# Patient Record
Sex: MALE | Race: WHITE
[De-identification: names, ages, dates, MRNs, and addresses within clinical notes are randomized per-mention and may not be internally consistent; named-entity substitution may affect disease eponyms.]

---

## 2017-01-28 ENCOUNTER — HOSPITAL ENCOUNTER (EMERGENCY)
Dept: HOSPITAL 61 - ER | Age: 57
Discharge: HOME | End: 2017-01-28
Payer: COMMERCIAL

## 2017-01-28 VITALS — WEIGHT: 201 LBS | BODY MASS INDEX: 28.77 KG/M2 | HEIGHT: 70 IN

## 2017-01-28 VITALS — SYSTOLIC BLOOD PRESSURE: 137 MMHG | DIASTOLIC BLOOD PRESSURE: 93 MMHG

## 2017-01-28 DIAGNOSIS — I10: ICD-10-CM

## 2017-01-28 DIAGNOSIS — F12.10: ICD-10-CM

## 2017-01-28 DIAGNOSIS — G89.29: ICD-10-CM

## 2017-01-28 DIAGNOSIS — E11.649: Primary | ICD-10-CM

## 2017-01-28 DIAGNOSIS — K21.9: ICD-10-CM

## 2017-01-28 DIAGNOSIS — Z88.2: ICD-10-CM

## 2017-01-28 DIAGNOSIS — E78.00: ICD-10-CM

## 2017-01-28 LAB
ANION GAP SERPL CALC-SCNC: 13 MMOL/L (ref 6–14)
BASOPHILS # BLD AUTO: 0.1 X10^3/UL (ref 0–0.2)
BASOPHILS NFR BLD: 1 % (ref 0–3)
BUN SERPL-MCNC: 33 MG/DL (ref 8–26)
CALCIUM SERPL-MCNC: 9.3 MG/DL (ref 8.5–10.1)
CHLORIDE SERPL-SCNC: 103 MMOL/L (ref 98–107)
CO2 SERPL-SCNC: 23 MMOL/L (ref 21–32)
CREAT SERPL-MCNC: 1.1 MG/DL (ref 0.7–1.3)
EOSINOPHIL NFR BLD: 4 % (ref 0–3)
ERYTHROCYTE [DISTWIDTH] IN BLOOD BY AUTOMATED COUNT: 12.5 % (ref 11.5–14.5)
GFR SERPLBLD BASED ON 1.73 SQ M-ARVRAT: 69.8 ML/MIN
GLUCOSE SERPL-MCNC: 113 MG/DL (ref 70–99)
HCT VFR BLD CALC: 42.7 % (ref 39–53)
HGB BLD-MCNC: 15 G/DL (ref 13–17.5)
LYMPHOCYTES # BLD: 1.5 X10^3/UL (ref 1–4.8)
LYMPHOCYTES NFR BLD AUTO: 19 % (ref 24–48)
MCH RBC QN AUTO: 32 PG (ref 25–35)
MCHC RBC AUTO-ENTMCNC: 35 G/DL (ref 31–37)
MCV RBC AUTO: 92 FL (ref 79–100)
MONOCYTES NFR BLD: 8 % (ref 0–9)
NEUTROPHILS NFR BLD AUTO: 68 % (ref 31–73)
PLATELET # BLD AUTO: 168 X10^3/UL (ref 140–400)
POTASSIUM SERPL-SCNC: 3.3 MMOL/L (ref 3.5–5.1)
RBC # BLD AUTO: 4.67 X10^6/UL (ref 4.3–5.7)
SODIUM SERPL-SCNC: 139 MMOL/L (ref 136–145)
WBC # BLD AUTO: 8.1 X10^3/UL (ref 4–11)

## 2017-01-28 PROCEDURE — 99284 EMERGENCY DEPT VISIT MOD MDM: CPT

## 2017-01-28 PROCEDURE — 85027 COMPLETE CBC AUTOMATED: CPT

## 2017-01-28 PROCEDURE — 80048 BASIC METABOLIC PNL TOTAL CA: CPT

## 2017-01-28 PROCEDURE — 82947 ASSAY GLUCOSE BLOOD QUANT: CPT

## 2017-01-28 PROCEDURE — 36415 COLL VENOUS BLD VENIPUNCTURE: CPT

## 2017-01-28 NOTE — PHYS DOC
Past Medical History


Past Medical History:  Diabetes-Type II, GERD, High Cholesterol, Hypertension, 

Kidney Stone, Other


Additional Past Medical Histor:  CHRONIC BACK PAIN, kidney stones


Past Surgical History:  Other


Additional Past Surgical Histo:  lithotripsy, back


Alcohol Use:  Heavy


Additional Information:  


drinking approx 1/5 whiskey weekly


Drug Use:  Marijuana





Adult General


Chief Complaint


Chief Complaint:  BLOOD SUGAR PROBLEM





HPI


HPI


Patient is a 56  year old male who presents by EMS for fatigue and altered 

mental status. His wife called EMS for concern of low blood sugar. He is on a 

long acting insulin only and has had a few episodes of hypoglycemia like this. 

He becomes sleepy and clammy. EMS arrived and he had a blood sugar in the 50s, 

of which she was given D50 IV with improvement of blood sugar to 110s.  He 

denies headache, vision changes, numbness, tingling, weakness, chest pain, 

abdominal pain, nausea or vomiting, diarrhea, dysuria.


He does mention having some bloody urine and flank pain over the past 2 days, 

but he has been asymptomatic of this today. He did not come here for evaluation 

of kidney stones, but he thinks this is related to his chronic kidney stones. 

He does not want to be evaluated for this as his symptoms are controlled.





Review of Systems


Review of Systems





Constitutional: Denies fever or chills []


Eyes: Denies change in visual acuity, redness, or eye pain []


HENT: Denies nasal congestion or sore throat []


Respiratory: Denies cough or shortness of breath []


Cardiovascular: No additional information not addressed in HPI []


GI: Denies abdominal pain, nausea, vomiting, bloody stools or diarrhea []


: Denies dysuria or hematuria []


Musculoskeletal: Denies back pain or joint pain []


Integument: Denies rash or skin lesions []


Neurologic: Denies headache, focal weakness or sensory changes []


Endocrine: Denies polyuria or polydipsia []





Allergies


Allergies





 Allergies








Coded Allergies Type Severity Reaction Last Updated Verified


 


  Sulfa (Sulfonamide Antibiotics) Allergy Severe WARD ERNESTINA SYNDROME 10/26/

16 Yes











Physical Exam


Physical Exam





Constitutional: Well developed, well nourished, no acute distress, non-toxic 

appearance. []


HENT: Normocephalic, atraumatic, bilateral external ears normal, oropharynx 

moist, no oral exudates, nose normal. []


Eyes: PERRLA, EOMI. [] 


Neck: Normal range of motion, supple. [] 


Cardiovascular:Heart rate regular rhythm []


Lungs & Thorax:  Bilateral breath sounds clear to auscultation []


Abdomen: Bowel sounds normal, soft, no tenderness. [] 


Skin: Warm, dry, no erythema, no rash. [] 


Back: No tenderness, no CVA tenderness. [] 


Extremities: ROM intact, no edema. [] 


Neurologic: Alert and oriented X 3, normal motor function, normal sensory 

function, no focal deficits noted, cranial nerves II through XII intact. []


Psychologic: Affect normal, judgement normal, mood normal. []





Current Patient Data


Vital Signs





 Vital Signs








  Date Time  Temp Pulse Resp B/P Pulse Ox O2 Delivery O2 Flow Rate FiO2


 


1/28/17 03:23  95 20 137/93 98   


 


1/28/17 00:56 97.0     Room Air  





 97.0       








Lab Values





 Laboratory Tests








Test


  1/28/17


01:01 1/28/17


01:40 1/28/17


03:06


 


Glucose (Fingerstick)


  116mg/dL


(70-99)  H 


  200mg/dL


(70-99)  H


 


White Blood Count


  


  8.1x10^3/uL


(4.0-11.0) 


 


 


Red Blood Count


  


  4.67x10^6/uL


(4.30-5.70) 


 


 


Hemoglobin


  


  15.0g/dL


(13.0-17.5) 


 


 


Hematocrit


  


  42.7%


(39.0-53.0) 


 


 


Mean Corpuscular Volume  92fL ()   


 


Mean Corpuscular Hemoglobin  32pg (25-35)   


 


Mean Corpuscular Hemoglobin


Concent 


  35g/dL (31-37)


  


 


 


Red Cell Distribution Width


  


  12.5%


(11.5-14.5) 


 


 


Platelet Count


  


  168x10^3/uL


(140-400) 


 


 


Neutrophils (%) (Auto)  68% (31-73)   


 


Lymphocytes (%) (Auto)  19% (24-48)  L 


 


Monocytes (%) (Auto)  8% (0-9)   


 


Eosinophils (%) (Auto)  4% (0-3)  H 


 


Basophils (%) (Auto)  1% (0-3)   


 


Neutrophils # (Auto)


  


  5.5x10^3uL


(1.8-7.7) 


 


 


Lymphocytes # (Auto)


  


  1.5x10^3/uL


(1.0-4.8) 


 


 


Monocytes # (Auto)


  


  0.6x10^3/uL


(0.0-1.1) 


 


 


Eosinophils # (Auto)


  


  0.3x10^3/uL


(0.0-0.7) 


 


 


Basophils # (Auto)


  


  0.1x10^3/uL


(0.0-0.2) 


 


 


Sodium Level


  


  139mmol/L


(136-145) 


 


 


Potassium Level


  


  3.3mmol/L


(3.5-5.1)  L 


 


 


Chloride Level


  


  103mmol/L


() 


 


 


Carbon Dioxide Level


  


  23mmol/L


(21-32) 


 


 


Anion Gap  13 (6-14)   


 


Blood Urea Nitrogen


  


  33mg/dL (8-26)


H 


 


 


Creatinine


  


  1.1mg/dL


(0.7-1.3) 


 


 


Estimated GFR


(Cockcroft-Gault) 


  69.8  


  


 


 


Glucose Level


  


  113mg/dL


(70-99)  H 


 


 


Calcium Level


  


  9.3mg/dL


(8.5-10.1) 


 





 Laboratory Tests


1/28/17 01:40








 Laboratory Tests


1/28/17 01:40














Course & Med Decision Making


Course & Med Decision Making


Pertinent Labs and Imaging studies reviewed. (See chart for details)





Upon arrival here, he is alert. He was given food to treat hypoglycemia. 

Laboratory evaluation is unremarkable. He has maintained a normal blood glucose 

during his ED stay. He would like to go home and monitor his blood sugar 

closely. He will follow-up with his primary care doctor for diabetic 

management. Return precautions discussed. He and wife understand and agree with 

plan.





Dragon Disclaimer


Dragon Disclaimer


This electronic medical record was generated, in whole or in part, using a 

voice recognition dictation system.





Departure


Departure


Impression:  


 Primary Impression:  


 Hypoglycemia


Disposition:  01 HOME, SELF-CARE


Condition:  STABLE


Referrals:  


FRIDA DOE (PCP)


Patient Instructions:  Hypoglycemia, Easy-to-Read





Additional Instructions:


Follow-up with your primary care doctor. Return for any concerns.








JALEN CAMARA MD Jan 28, 2017 03:13

## 2017-03-04 ENCOUNTER — HOSPITAL ENCOUNTER (EMERGENCY)
Dept: HOSPITAL 61 - ER | Age: 57
LOS: 1 days | Discharge: HOME | End: 2017-03-05
Payer: COMMERCIAL

## 2017-03-04 VITALS — WEIGHT: 192 LBS | BODY MASS INDEX: 26.88 KG/M2 | HEIGHT: 71 IN

## 2017-03-04 DIAGNOSIS — E78.00: ICD-10-CM

## 2017-03-04 DIAGNOSIS — G89.29: ICD-10-CM

## 2017-03-04 DIAGNOSIS — N20.0: Primary | ICD-10-CM

## 2017-03-04 DIAGNOSIS — F12.10: ICD-10-CM

## 2017-03-04 DIAGNOSIS — Z88.2: ICD-10-CM

## 2017-03-04 DIAGNOSIS — E11.9: ICD-10-CM

## 2017-03-04 DIAGNOSIS — I10: ICD-10-CM

## 2017-03-04 DIAGNOSIS — K21.9: ICD-10-CM

## 2017-03-04 LAB
ALBUMIN SERPL-MCNC: 3.8 G/DL (ref 3.4–5)
ALBUMIN/GLOB SERPL: 1 {RATIO} (ref 1–1.7)
ALP SERPL-CCNC: 142 U/L (ref 46–116)
ALT SERPL-CCNC: 21 U/L (ref 16–63)
ANION GAP SERPL CALC-SCNC: 16 MMOL/L (ref 6–14)
AST SERPL-CCNC: 25 U/L (ref 15–37)
BACTERIA #/AREA URNS HPF: 0 /HPF
BASOPHILS # BLD AUTO: 0 X10^3/UL (ref 0–0.2)
BASOPHILS NFR BLD: 1 % (ref 0–3)
BILIRUB SERPL-MCNC: 1.3 MG/DL (ref 0.2–1)
BILIRUB UR QL STRIP: NEGATIVE
BUN SERPL-MCNC: 23 MG/DL (ref 8–26)
BUN/CREAT SERPL: 23 (ref 6–20)
CALCIUM SERPL-MCNC: 9.6 MG/DL (ref 8.5–10.1)
CHLORIDE SERPL-SCNC: 102 MMOL/L (ref 98–107)
CO2 SERPL-SCNC: 21 MMOL/L (ref 21–32)
CREAT SERPL-MCNC: 1 MG/DL (ref 0.7–1.3)
EOSINOPHIL NFR BLD: 5 % (ref 0–3)
ERYTHROCYTE [DISTWIDTH] IN BLOOD BY AUTOMATED COUNT: 12.8 % (ref 11.5–14.5)
GFR SERPLBLD BASED ON 1.73 SQ M-ARVRAT: 77.9 ML/MIN
GLOBULIN SER-MCNC: 3.7 G/DL (ref 2.2–3.8)
GLUCOSE SERPL-MCNC: 174 MG/DL (ref 70–99)
GLUCOSE UR STRIP-MCNC: NEGATIVE MG/DL
HCT VFR BLD CALC: 43.2 % (ref 39–53)
HGB BLD-MCNC: 14.9 G/DL (ref 13–17.5)
LYMPHOCYTES # BLD: 0.5 X10^3/UL (ref 1–4.8)
LYMPHOCYTES NFR BLD AUTO: 7 % (ref 24–48)
MCH RBC QN AUTO: 32 PG (ref 25–35)
MCHC RBC AUTO-ENTMCNC: 35 G/DL (ref 31–37)
MCV RBC AUTO: 92 FL (ref 79–100)
MONOCYTES NFR BLD: 5 % (ref 0–9)
NEUTROPHILS NFR BLD AUTO: 83 % (ref 31–73)
NITRITE UR QL STRIP: NEGATIVE
PH UR STRIP: 5.5 [PH]
PLATELET # BLD AUTO: 179 X10^3/UL (ref 140–400)
POTASSIUM SERPL-SCNC: 4.3 MMOL/L (ref 3.5–5.1)
PROT SERPL-MCNC: 7.5 G/DL (ref 6.4–8.2)
PROT UR STRIP-MCNC: NEGATIVE MG/DL
RBC # BLD AUTO: 4.68 X10^6/UL (ref 4.3–5.7)
RBC #/AREA URNS HPF: (no result) /HPF (ref 0–2)
SODIUM SERPL-SCNC: 139 MMOL/L (ref 136–145)
SP GR UR STRIP: 1.02
SQUAMOUS #/AREA URNS LPF: (no result) /LPF
UROBILINOGEN UR-MCNC: 1 MG/DL
WBC # BLD AUTO: 6.5 X10^3/UL (ref 4–11)
WBC #/AREA URNS HPF: (no result) /HPF (ref 0–4)

## 2017-03-04 PROCEDURE — 96375 TX/PRO/DX INJ NEW DRUG ADDON: CPT

## 2017-03-04 PROCEDURE — 36415 COLL VENOUS BLD VENIPUNCTURE: CPT

## 2017-03-04 PROCEDURE — 83690 ASSAY OF LIPASE: CPT

## 2017-03-04 PROCEDURE — 96374 THER/PROPH/DIAG INJ IV PUSH: CPT

## 2017-03-04 PROCEDURE — 99285 EMERGENCY DEPT VISIT HI MDM: CPT

## 2017-03-04 PROCEDURE — 81001 URINALYSIS AUTO W/SCOPE: CPT

## 2017-03-04 PROCEDURE — 80053 COMPREHEN METABOLIC PANEL: CPT

## 2017-03-04 PROCEDURE — 85027 COMPLETE CBC AUTOMATED: CPT

## 2017-03-04 PROCEDURE — 74176 CT ABD & PELVIS W/O CONTRAST: CPT

## 2017-03-04 PROCEDURE — 96361 HYDRATE IV INFUSION ADD-ON: CPT

## 2017-03-04 NOTE — RAD
CT abdomen pelvis without contrast dated 03/04/2017. 

 

PROCEDURE 

 

 

 

HISTORY 

Right flank pain and hematuria. 

 

TECHNIQUE 

Contiguous axial imaging of the abdomen and pelvis performed without the 

administration of intravenous contrast.Exposure: One or more of the 

following individualized dose reduction techniques were utilized for this 

exam: 1. Automated exposure control. 2. Adjustment of the mA and/or kV 

according to patient size. 3. Use of iterative reconstruction technique. 

 

COMPARISON 

None available at this time 

 

FINDINGS 

Limited images of lung bases are clear. Heart size within normal limits. 

No pleural or pericardial effusion. Small noncalcified pulmonary nodule 

left lower lobe on image 17 measures 3 millimeters, nonspecific. 

Solid abdominal viscera not well evaluated in the absence of contrast 

material. No apparent attenuation abnormality of the liver or spleen. 

Pancreas, adrenal glands, gallbladder unremarkable. 

There is focal cortical scarring at the mid to upper pole right kidney 

with partially calcified nodule that measures 24 Hounsfield units. Low 

density lesions of the upper and lower pole, most consistent with cysts. 

Additional indeterminate nodule medially measuring about 8 millimeters and

mean Hounsfield units of 7 seen. There are 2 small stones at the lower 

pole. 

On the left, there is a 9 millimeter calcific stone within the mid pole 

infundibulum. Two additional smaller stones at the left kidney lower pole.

No calculus identified along the course of either ureter. 

Unopacified GI tract normal in caliber and contour. No focal bowel wall 

thickening. No inflammatory stranding in the mesenteric. No ascites or 

lymphadenopathy. Appendix within normal limits in caliber. 

Images of pelvis show nondistended urinary bladder. No free pelvic fluid 

or pelvic lymphadenopathy. 

Bone windows show no acute findings. Multilevel spondylosis. 

 

IMPRESSION 

- Bilateral nephrolithiasis, nonobstructive. There is a 9 millimeter 

calcific stone at the mid pole infundibulum on the left.

- Indeterminate low density lesions of the right kidney. Suggest direct 

comparison a prior exam. Alternatively, a pre and post contrast renal CT 

could better evaluate.

- Small pericardial effusion.

 

 

 

Electronically signed by: Bruno Funes (Mar 04, 2017 23:49:46)

## 2017-03-05 VITALS — DIASTOLIC BLOOD PRESSURE: 76 MMHG | SYSTOLIC BLOOD PRESSURE: 128 MMHG

## 2017-03-05 NOTE — PHYS DOC
Past Medical History


Past Medical History:  Diabetes-Type II, GERD, High Cholesterol, Hypertension, 

Kidney Stone, Other


Additional Past Medical Histor:  CHRONIC BACK PAIN, kidney stones


Past Surgical History:  Other


Additional Past Surgical Histo:  lithotripsy, back


Alcohol Use:  Heavy


Drug Use:  Marijuana





Adult General


Chief Complaint


Chief Complaint:  FLANK PAIN





HPI


HPI


Patient is a 56  year old  gentleman who presents here today complaining of 

right flank pain. Patient reports it feels like his prior kidney stones. 

Patient reports that he was here on December 11 and was diagnosed with a 4 mm 

stone. Patient reports she's had hematuria for approximately 1-2 days of 

intermittent pain for the same time. Patient denies any fevers shakes chills. 

Patient has any diarrhea. Patient is nauseous and has had 1-2 episodes of 

vomiting. Patient reports his Abdominal Discomfort and Is Right Flank Radiating 

to His Right Lower Quadrant. Patient Reports Took One Hydrocodone Pressure 

Whenever Prior to Arrival. Patient Reports She Smokes Marijuana Occasionally. 

Patient Has Any Alcohol Use. Patient Is a History of Hypertension and Diabetes. 

Patient Has Had No Abdominal Surgeries.





Patient's physical exam is unremarkable except for tenderness to palpation to 

his right flank and right lower quadrant region. Patient's abdomen is otherwise 

soft nondistended no rebound or guarding. Patient is not exhibiting any signs 

or symptoms I be consistent with an acute surgical abdomen.





Patient had labs and a CT scan done in the ED. Patient's labs were unremarkable 

except for RBCs in his urine. Patient's CT scan of his abdomen and pelvis did 

not reveal any evidence of hydronephrosis or obstructive renal calculi. I 

discussed with the patient the CT report and I will give him a copy. It does 

appear that the patient knows about the cysts in his right kidney as well as a 

large stone in his left kidney. Patient will has an appointment next Monday to 

see his urologist. Patient was given a copy of his CT scan to take with him for 

review with his urologist.





While in the ED the patient was given a shot of Toradol Dilaudid and Zofran as 

well as saline. Patient is currently pain-free and feels very comfortable with 

the plan to be discharged home and to follow up with his primary care physician.





Review of Systems


Review of Systems





Constitutional: Denies fever or chills []


Eyes: Denies change in visual acuity, redness, or eye pain []


HENT: Denies nasal congestion or sore throat []


All other review systems are negative except as documented in the history of 

present illness portion.





Current Medications


Current Medications





 Current Medications








 Medications


  (Trade)  Dose


 Ordered  Sig/Apple  Start Time


 Stop Time Status Last Admin


Dose Admin


 


 Hydromorphone HCl


  (Dilaudid)  1 mg  1X  ONCE  3/4/17 23:15


 3/4/17 23:16 DC 3/4/17 23:21


1 MG


 


 Ketorolac


 Tromethamine


  (Toradol)  15 mg  1X  ONCE  3/4/17 23:15


 3/4/17 23:16 DC 3/4/17 23:20


15 MG


 


 Ondansetron HCl


  (Zofran)  4 mg  1X  ONCE  3/4/17 23:15


 3/4/17 23:16 DC 3/4/17 23:19


4 MG


 


 Sodium Chloride


  (Iv Sodium


 Chloride 0.9%


 1000ml Bag)  1,000 ml @ 


 1,000 mls/hr  Q1H  3/4/17 22:06


 3/4/17 23:05 DC 3/4/17 22:25


1,000 MLS/HR











Allergies


Allergies





 Allergies








Coded Allergies Type Severity Reaction Last Updated Verified


 


  Sulfa (Sulfonamide Antibiotics) Allergy Severe WARD ERNESTINA SYNDROME 10/26/

16 Yes











Physical Exam


Physical Exam





Constitutional: Well developed, well nourished, no acute distress, non-toxic 

appearance. []


HENT: Normocephalic, atraumatic, bilateral external ears normal, oropharynx 

moist, no oral exudates, nose normal. []


Eyes: PERRLA, EOMI, conjunctiva normal, no discharge. [] 


Neck: Normal range of motion, no tenderness, supple, no stridor. [] 


Cardiovascular:Heart rate regular rhythm, no murmur []


Lungs & Thorax:  Bilateral breath sounds clear to auscultation []


Abdomen: Bowel sounds normal, soft,, no masses, no pulsatile masses. [] 


Skin: Warm, dry, no erythema, no rash. [] 


Back: No tenderness, no CVA tenderness. [] 


Extremities: No tenderness, no cyanosis, no clubbing, ROM intact, no edema. [] 


Neurologic: Alert and oriented X 3, normal motor function, normal sensory 

function, no focal deficits noted. []


Psychologic: Affect normal, judgement normal, mood normal. []





Current Patient Data


Vital Signs





 Vital Signs








  Date Time  Temp Pulse Resp B/P Pulse Ox O2 Delivery O2 Flow Rate FiO2


 


3/4/17 23:03  113 11 125/81 99 Room Air  


 


3/4/17 22:15 98.3       





 98.3       








Lab Values





 Laboratory Tests








Test


  3/4/17


22:07 3/4/17


22:23


 


Urine Collection Type Unknown   


 


Urine Color Yellow   


 


Urine Clarity Clear   


 


Urine pH 5.5   


 


Urine Specific Gravity 1.025   


 


Urine Protein


  Negativemg/dL


(NEG-TRACE) 


 


 


Urine Glucose (UA)


  Negativemg/dL


(NEG) 


 


 


Urine Ketones (Stick)


  Negativemg/dL


(NEG) 


 


 


Urine Blood


  Moderate (NEG)


  


 


 


Urine Nitrite


  Negative (NEG)


  


 


 


Urine Bilirubin


  Negative (NEG)


  


 


 


Urine Urobilinogen Dipstick


  1.0mg/dL (0.2


mg/dL) 


 


 


Urine Leukocyte Esterase


  Negative (NEG)


  


 


 


Urine RBC


  20-40/HPF


(0-2) 


 


 


Urine WBC Occ/HPF (0-4)   


 


Urine Squamous Epithelial


Cells Occ/LPF  


  


 


 


Urine Bacteria 0/HPF (0-FEW)   


 


Urine Mucus Mod/LPF   


 


White Blood Count


  


  6.5x10^3/uL


(4.0-11.0)


 


Red Blood Count


  


  4.68x10^6/uL


(4.30-5.70)


 


Hemoglobin


  


  14.9g/dL


(13.0-17.5)


 


Hematocrit


  


  43.2%


(39.0-53.0)


 


Mean Corpuscular Volume  92fL ()  


 


Mean Corpuscular Hemoglobin  32pg (25-35)  


 


Mean Corpuscular Hemoglobin


Concent 


  35g/dL (31-37)


 


 


Red Cell Distribution Width


  


  12.8%


(11.5-14.5)


 


Platelet Count


  


  179x10^3/uL


(140-400)


 


Neutrophils (%) (Auto)  83% (31-73)  H


 


Lymphocytes (%) (Auto)  7% (24-48)  L


 


Monocytes (%) (Auto)  5% (0-9)  


 


Eosinophils (%) (Auto)  5% (0-3)  H


 


Basophils (%) (Auto)  1% (0-3)  


 


Neutrophils # (Auto)


  


  5.4x10^3uL


(1.8-7.7)


 


Lymphocytes # (Auto)


  


  0.5x10^3/uL


(1.0-4.8)  L


 


Monocytes # (Auto)


  


  0.3x10^3/uL


(0.0-1.1)


 


Eosinophils # (Auto)


  


  0.3x10^3/uL


(0.0-0.7)


 


Basophils # (Auto)


  


  0.0x10^3/uL


(0.0-0.2)


 


Sodium Level


  


  139mmol/L


(136-145)


 


Potassium Level


  


  4.3mmol/L


(3.5-5.1)


 


Chloride Level


  


  102mmol/L


()


 


Carbon Dioxide Level


  


  21mmol/L


(21-32)


 


Anion Gap  16 (6-14)  H


 


Blood Urea Nitrogen


  


  23mg/dL (8-26)


 


 


Creatinine


  


  1.0mg/dL


(0.7-1.3)


 


Estimated GFR


(Cockcroft-Gault) 


  77.9  


 


 


BUN/Creatinine Ratio  23 (6-20)  H


 


Glucose Level


  


  174mg/dL


(70-99)  H


 


Calcium Level


  


  9.6mg/dL


(8.5-10.1)


 


Total Bilirubin


  


  1.3mg/dL


(0.2-1.0)  H


 


Aspartate Amino Transferase


(AST) 


  25U/L (15-37)  


 


 


Alanine Aminotransferase (ALT)  21U/L (16-63)  


 


Alkaline Phosphatase


  


  142U/L


()  H


 


Total Protein


  


  7.5g/dL


(6.4-8.2)


 


Albumin


  


  3.8g/dL


(3.4-5.0)


 


Albumin/Globulin Ratio  1.0 (1.0-1.7)  


 


Lipase


  


  90U/L ()


 





 Laboratory Tests


3/4/17 22:23








 Laboratory Tests


3/4/17 22:23














EKG


EKG


[]





Radiology/Procedures


Radiology/Procedures


[]





Course & Med Decision Making


Course & Med Decision Making


Pertinent Labs and Imaging studies reviewed. (See chart for details)





[]





Dragon Disclaimer


Dragon Disclaimer


This electronic medical record was generated, in whole or in part, using a 

voice recognition dictation system.





Departure


Departure


Impression:  


 Primary Impression:  


 Renal calculus, right


Disposition:  01 HOME, SELF-CARE


Condition:  IMPROVED


Referrals:  


FRIDA DOE (PCP)


Patient Instructions:  Abdominal Pain, Kidney Stones


Scripts


Hydrocodone/Acetaminophen (Lortab 5-325 mg Tablet)1 Each Tablet1 Tab PO PRN 

Q6HRS PRN PAIN #14 TAB


   Prov:YONI CARTWRIGHT MD         3/5/17








YONI CARTWRIGHT MD Mar 5, 2017 00:20

## 2017-04-08 ENCOUNTER — HOSPITAL ENCOUNTER (OUTPATIENT)
Dept: HOSPITAL 61 - ER | Age: 57
Setting detail: OBSERVATION
LOS: 1 days | Discharge: HOME | End: 2017-04-09
Attending: INTERNAL MEDICINE | Admitting: INTERNAL MEDICINE
Payer: COMMERCIAL

## 2017-04-08 VITALS — HEIGHT: 70 IN | WEIGHT: 201.37 LBS | BODY MASS INDEX: 28.83 KG/M2

## 2017-04-08 VITALS — DIASTOLIC BLOOD PRESSURE: 73 MMHG | SYSTOLIC BLOOD PRESSURE: 118 MMHG

## 2017-04-08 VITALS — SYSTOLIC BLOOD PRESSURE: 118 MMHG | DIASTOLIC BLOOD PRESSURE: 73 MMHG

## 2017-04-08 DIAGNOSIS — E86.1: ICD-10-CM

## 2017-04-08 DIAGNOSIS — K21.9: ICD-10-CM

## 2017-04-08 DIAGNOSIS — F12.10: ICD-10-CM

## 2017-04-08 DIAGNOSIS — G47.30: ICD-10-CM

## 2017-04-08 DIAGNOSIS — G92: ICD-10-CM

## 2017-04-08 DIAGNOSIS — I10: ICD-10-CM

## 2017-04-08 DIAGNOSIS — F41.9: ICD-10-CM

## 2017-04-08 DIAGNOSIS — E11.9: ICD-10-CM

## 2017-04-08 DIAGNOSIS — E86.0: ICD-10-CM

## 2017-04-08 DIAGNOSIS — R41.82: Primary | ICD-10-CM

## 2017-04-08 DIAGNOSIS — Z87.442: ICD-10-CM

## 2017-04-08 DIAGNOSIS — E87.2: ICD-10-CM

## 2017-04-08 DIAGNOSIS — N17.9: ICD-10-CM

## 2017-04-08 DIAGNOSIS — E78.5: ICD-10-CM

## 2017-04-08 DIAGNOSIS — R47.81: ICD-10-CM

## 2017-04-08 DIAGNOSIS — E78.00: ICD-10-CM

## 2017-04-08 LAB
ALBUMIN SERPL-MCNC: 3.6 G/DL (ref 3.4–5)
ALP SERPL-CCNC: 177 U/L (ref 46–116)
ALT SERPL-CCNC: 29 U/L (ref 16–63)
ANION GAP SERPL CALC-SCNC: 12 MMOL/L (ref 6–14)
AST SERPL-CCNC: 21 U/L (ref 15–37)
BACTERIA #/AREA URNS HPF: (no result) /HPF
BARBITURATES UR-MCNC: (no result) UG/ML
BASOPHILS # BLD AUTO: 0 X10^3/UL (ref 0–0.2)
BASOPHILS NFR BLD: 0 % (ref 0–3)
BENZODIAZ UR-MCNC: (no result) UG/L
BILIRUB DIRECT SERPL-MCNC: 0.1 MG/DL (ref 0–0.2)
BILIRUB SERPL-MCNC: 0.5 MG/DL (ref 0.2–1)
BILIRUB UR QL STRIP: NEGATIVE
BUN SERPL-MCNC: 18 MG/DL (ref 8–26)
CALCIUM SERPL-MCNC: 9.5 MG/DL (ref 8.5–10.1)
CANNABINOIDS UR-MCNC: (no result) UG/L
CHLORIDE SERPL-SCNC: 105 MMOL/L (ref 98–107)
CO2 SERPL-SCNC: 25 MMOL/L (ref 21–32)
COCAINE UR-MCNC: (no result) NG/ML
CREAT SERPL-MCNC: 1.4 MG/DL (ref 0.7–1.3)
EOSINOPHIL NFR BLD: 4 % (ref 0–3)
ERYTHROCYTE [DISTWIDTH] IN BLOOD BY AUTOMATED COUNT: 13.3 % (ref 11.5–14.5)
ETHANOL, URINE: (no result)
GFR SERPLBLD BASED ON 1.73 SQ M-ARVRAT: 52.8 ML/MIN
GLUCOSE SERPL-MCNC: 178 MG/DL (ref 70–99)
GLUCOSE UR STRIP-MCNC: NEGATIVE MG/DL
HCT VFR BLD CALC: 39.6 % (ref 39–53)
HGB BLD-MCNC: 13.6 G/DL (ref 13–17.5)
LYMPHOCYTES # BLD: 1.6 X10^3/UL (ref 1–4.8)
LYMPHOCYTES NFR BLD AUTO: 23 % (ref 24–48)
MCH RBC QN AUTO: 32 PG (ref 25–35)
MCHC RBC AUTO-ENTMCNC: 34 G/DL (ref 31–37)
MCV RBC AUTO: 94 FL (ref 79–100)
METHADONE SERPL-MCNC: (no result) NG/ML
MONOCYTES NFR BLD: 8 % (ref 0–9)
NEUTROPHILS NFR BLD AUTO: 66 % (ref 31–73)
NITRITE UR QL STRIP: NEGATIVE
OPIATES UR-MCNC: (no result) NG/ML
PCP SERPL-MCNC: (no result) MG/DL
PH UR STRIP: 5.5 [PH]
PLATELET # BLD AUTO: 227 X10^3/UL (ref 140–400)
POTASSIUM SERPL-SCNC: 4.1 MMOL/L (ref 3.5–5.1)
PROT SERPL-MCNC: 7.5 G/DL (ref 6.4–8.2)
PROT UR STRIP-MCNC: NEGATIVE MG/DL
RBC # BLD AUTO: 4.2 X10^6/UL (ref 4.3–5.7)
RBC #/AREA URNS HPF: (no result) /HPF (ref 0–2)
SODIUM SERPL-SCNC: 142 MMOL/L (ref 136–145)
SP GR UR STRIP: 1.02
SQUAMOUS #/AREA URNS LPF: (no result) /LPF
UROBILINOGEN UR-MCNC: 0.2 MG/DL
WBC # BLD AUTO: 7.2 X10^3/UL (ref 4–11)
WBC #/AREA URNS HPF: (no result) /HPF (ref 0–4)

## 2017-04-08 PROCEDURE — 81001 URINALYSIS AUTO W/SCOPE: CPT

## 2017-04-08 PROCEDURE — 82947 ASSAY GLUCOSE BLOOD QUANT: CPT

## 2017-04-08 PROCEDURE — 71010: CPT

## 2017-04-08 PROCEDURE — 83605 ASSAY OF LACTIC ACID: CPT

## 2017-04-08 PROCEDURE — 83690 ASSAY OF LIPASE: CPT

## 2017-04-08 PROCEDURE — 83930 ASSAY OF BLOOD OSMOLALITY: CPT

## 2017-04-08 PROCEDURE — G0379 DIRECT REFER HOSPITAL OBSERV: HCPCS

## 2017-04-08 PROCEDURE — 70553 MRI BRAIN STEM W/O & W/DYE: CPT

## 2017-04-08 PROCEDURE — 84484 ASSAY OF TROPONIN QUANT: CPT

## 2017-04-08 PROCEDURE — 83880 ASSAY OF NATRIURETIC PEPTIDE: CPT

## 2017-04-08 PROCEDURE — A9585 GADOBUTROL INJECTION: HCPCS

## 2017-04-08 PROCEDURE — 80076 HEPATIC FUNCTION PANEL: CPT

## 2017-04-08 PROCEDURE — 70450 CT HEAD/BRAIN W/O DYE: CPT

## 2017-04-08 PROCEDURE — 93005 ELECTROCARDIOGRAM TRACING: CPT

## 2017-04-08 PROCEDURE — 80048 BASIC METABOLIC PNL TOTAL CA: CPT

## 2017-04-08 PROCEDURE — 36415 COLL VENOUS BLD VENIPUNCTURE: CPT

## 2017-04-08 PROCEDURE — G0378 HOSPITAL OBSERVATION PER HR: HCPCS

## 2017-04-08 PROCEDURE — 85027 COMPLETE CBC AUTOMATED: CPT

## 2017-04-08 PROCEDURE — 96360 HYDRATION IV INFUSION INIT: CPT

## 2017-04-08 RX ADMIN — INSULIN ASPART SCH UNITS: 100 INJECTION, SOLUTION INTRAVENOUS; SUBCUTANEOUS at 21:00

## 2017-04-08 NOTE — PHYS DOC
Past Medical History


Past Medical History:  Anxiety, Diabetes-Type II, GERD, High Cholesterol, 

Hypertension, Kidney Stone, Other


Additional Past Medical Histor:  CHRONIC BACK PAIN


Past Surgical History:  Other


Additional Past Surgical Histo:  lithotripsy, back,CYSTOSCOPY W/ KIDENY STONE 

REMOVAL & STENT


Alcohol Use:  Heavy


Additional Information:  


2 DRINKS LAST NIGHT


Drug Use:  Marijuana





Adult General


Chief Complaint


Chief Complaint:  NEURO SYMPTOMS/DEFICITS





HPI


HPI


46-year-old male with a history of type 2 diabetes hypertension and high 

cholesterol presenting to the emergency department today with generalized 

weakness and slurred speech with intermittent confusion over the past 4 or 5 

days. The patient was recently started on diazepam and buspirone and that is 

when the patient's symptoms started according to the patient's wife who is here 

with him today. She reports him being extra sleepy. Both the patient and his 

wife deny him having unilateral focal weakness. His initial symptoms started 

approximately a week ago however over the past 2-3 days he has been more 

confused. Location brain/generalized. Duration intermittent. No alleviating 

factors present.





Review of systems is negative for chest pain shortness of breath abdominal pain 

nausea vomiting diaphoresis fevers or chills. All other review of systems is 

negative unless otherwise noted in history of present illness.





Review of Systems


Review of Systems


SEE ABOVE.





Current Medications


Current Medications








 Current Medications








 Medications


  (Trade)  Dose


 Ordered  Sig/Apple  Start Time


 Stop Time Status Last Admin


Dose Admin


 


 Sodium Chloride


  (Iv Sodium


 Chloride 0.9%


 1000ml Bag)  1,000 ml @ 


 1,000 mls/hr  1X  ONCE  4/8/17 18:30


 4/8/17 19:29 DC 4/8/17 18:40


1,000 MLS/HR














Allergies


Allergies





 Allergies








Coded Allergies Type Severity Reaction Last Updated Verified


 


  Sulfa (Sulfonamide Antibiotics) Allergy Severe WARD ERNESTINA SYNDROME 10/26/

16 Yes











Physical Exam


Physical Exam





Constitutional: Well developed, well nourished, no acute distress, non-toxic 

appearance. 


HENT: Normocephalic, atraumatic, bilateral external ears normal, oropharynx 

moist, no oral exudates, nose normal. []


Eyes: PERRLA, EOMI, conjunctiva normal, no discharge. [] 


Neck: Normal range of motion, no tenderness, supple, no stridor.  


Cardiovascular:Heart rate regular rhythm, no murmur []


Lungs & Thorax:  Bilateral breath sounds clear to auscultation 


Abdomen: Bowel sounds normal, soft, no tenderness, no masses, no pulsatile 

masses. [] 


Skin: Warm, dry, no erythema, no rash. [] 


Back: No tenderness, no CVA tenderness.  


Extremities: No tenderness, no cyanosis, no clubbing, ROM intact, no edema. [] 


Neurologic: 





Mental status: Somnolent but opens his eyes spontaneously. The patient drifts 

in and out of confusion even during the examination. He is able to stand 

without assistance.





Cranial nerves: Extraocular movements intact, eyebrows alvaro bilaterally smile 

symmetric, uvula elevation, shoulder shrug intact, tongue protrusion normal





DTRs: 2+





sensation: Unable to assess at this time due to the patient's intermittent 

confusion. Patient responds to prick sensation in all 4 extremities





Strength: 5/5 in upper and lower extremities bilaterally





Psychologic: Affect normal, judgement normal, mood normal.





Current Patient Data


Vital Signs





 Vital Signs








  Date Time  Temp Pulse Resp B/P Pulse Ox O2 Delivery O2 Flow Rate FiO2


 


4/8/17 17:45 97.7 107 20 105/74 97 Room Air  





 97.7       








Lab Values





 Laboratory Tests








Test


  4/8/17


18:25


 


White Blood Count


  7.2x10^3/uL


(4.0-11.0)


 


Red Blood Count


  4.20x10^6/uL


(4.30-5.70)  L


 


Hemoglobin


  13.6g/dL


(13.0-17.5)


 


Hematocrit


  39.6%


(39.0-53.0)


 


Mean Corpuscular Volume 94fL ()  


 


Mean Corpuscular Hemoglobin 32pg (25-35)  


 


Mean Corpuscular Hemoglobin


Concent 34g/dL (31-37)


 


 


Red Cell Distribution Width


  13.3%


(11.5-14.5)


 


Platelet Count


  227x10^3/uL


(140-400)


 


Neutrophils (%) (Auto) 66% (31-73)  


 


Lymphocytes (%) (Auto) 23% (24-48)  L


 


Monocytes (%) (Auto) 8% (0-9)  


 


Eosinophils (%) (Auto) 4% (0-3)  H


 


Basophils (%) (Auto) 0% (0-3)  


 


Neutrophils # (Auto)


  4.7x10^3uL


(1.8-7.7)


 


Lymphocytes # (Auto)


  1.6x10^3/uL


(1.0-4.8)


 


Monocytes # (Auto)


  0.6x10^3/uL


(0.0-1.1)


 


Eosinophils # (Auto)


  0.3x10^3/uL


(0.0-0.7)


 


Basophils # (Auto)


  0.0x10^3/uL


(0.0-0.2)


 


Sodium Level


  142mmol/L


(136-145)


 


Potassium Level


  4.1mmol/L


(3.5-5.1)


 


Chloride Level


  105mmol/L


()


 


Carbon Dioxide Level


  25mmol/L


(21-32)


 


Anion Gap 12 (6-14)  


 


Blood Urea Nitrogen


  18mg/dL (8-26)


 


 


Creatinine


  1.4mg/dL


(0.7-1.3)  H


 


Estimated GFR


(Cockcroft-Gault) 52.8  


 


 


Glucose Level


  178mg/dL


(70-99)  H


 


Serum Osmolality


  295mOsm/Kg


(279-304)


 


Lactic Acid Level


  2.2mmol/L


(0.4-2.0)  H


 


Calcium Level


  9.5mg/dL


(8.5-10.1)


 


Total Bilirubin


  0.5mg/dL


(0.2-1.0)


 


Direct Bilirubin


  0.1mg/dL


(0.0-0.2)


 


Aspartate Amino Transferase


(AST) 21U/L (15-37)  


 


 


Alanine Aminotransferase (ALT) 29U/L (16-63)  


 


Alkaline Phosphatase


  177U/L


()  H


 


Troponin I Quantitative


  < 0.017ng/mL


(0.000-0.055)


 


NT-Pro-B-Type Natriuretic


Peptide 100pg/mL


(0-124)


 


Total Protein


  7.5g/dL


(6.4-8.2)


 


Albumin


  3.6g/dL


(3.4-5.0)


 


Lipase


  93U/L ()


 


 


Ethyl Alcohol Level


  < 10mg/dL


(0-10)





 Laboratory Tests


4/8/17 18:25








 Laboratory Tests


4/8/17 18:25











EKG


EKG


[]





Radiology/Procedures


Radiology/Procedures


[]





Course & Med Decision Making


Course & Med Decision Making


Pertinent Labs and Imaging studies reviewed. (See chart for details)





[] 56-year-old male presenting to the emergency department today with slurred 

speech and intermittent confusion over the past week essentially with a 

worsening progression slowly. Initial vital signs showed that he was afebrile 

with tachycardia. Otherwise mild hypotension present. Pertinent physical exam 

showed negative Kernig sign and negative Brudzinski sign no neck stiffness 

present. No evidence of petechiae in the lower extremities. It is possible that 

the patient's use of benzodiazepines mixed with SSRIs have caused the patient's 

condition though at this point time I feel it is important to rule out stroke. 

Head CT unremarkable. Blood glucose normal. Blood work sent which was 

unremarkable other than an elevated lactic acidosis. The patient clinically did 

not present with suspected infection so did not enter into our sepsis protocol. 

I did feel the patient was hypovolemic and thus ordered volume resuscitation. I 

discussed the case with Dr. Son who agreed to see the patient. I then 

admitted the patient to our hospital for further evaluation workup and care. 

Neurology consult placed.





Dragon Disclaimer


Dragon Disclaimer


This electronic medical record was generated, in whole or in part, using a 

voice recognition dictation system.





Departure


Departure


Impression:  


 Primary Impression:  


 Confusion


 Additional Impressions:  


 Weakness


 Hypovolemia


 Lactic acidosis


Disposition:  09 ADMITTED AS INPATIENT


Admitting Physician:  Guerita Oro


Condition:  STABLE


Referrals:  


FRIDA DOE (PCP)





Problem Qualifiers








BRYANT BASS MD Apr 8, 2017 18:22

## 2017-04-08 NOTE — RAD
BRAIN WO/W CONTRAST 

 

 

Indication: NO PRIORS..GAVE 9ML GADAVIST...PT C/O CONFUSION AND 

GENERALIZED WEAKNESS...ECT 4696 Reason: INTERMITTENT CONFUSION AND SLURRED

SPEECH EVAL FOR STROKE / Spl. Instructions: / History: 

 

 

COMPARISON: CT head from earlier the same date 

 

 

TECHNIQUE: Axial diffusion weighted imaging was obtained. Additional 

sagittal T1, axial T1, axial FLAIR, and axial T2 weighted imaging of the 

brain was also performed. Postcontrast T1 weighted imaging was also 

performed after intravenous administration of gadolinium based contrast. 

 

FINDINGS: 

 

There are few small foci of FLAIR signal hyperintensity in the 

periventricular white matter but these are nonspecific and probably 

related to sequelae of mild chronic small vessel ischemic disease. No 

abnormal intracranial enhancement. No evidence of acute intracranial 

hemorrhage. No restricted diffusion to indicate acute infarct. No 

extra-axial fluid collections. No midline shift or mass effect. 

Ventricular size is appropriate. 

 

 

Midline structures have a normal anatomic configuration. Pituitary gland 

and infundibulum are unremarkable. Basal cisterns are patent. Arterial 

flow voids at the skull base and major dural venous sinuses are 

maintained. Globes and orbits are unremarkable. There is very mild mucosal

thickening of the ethmoid air cells. 

 

 

IMPRESSION: 

 

No acute intracranial abnormality. No abnormal enhancement or mass. 

 

Electronically signed by: Suman Maria (Apr 08, 2017 20:30:19)

## 2017-04-08 NOTE — PDOC1
History and Physical


Date of Admission


Date of Admission


4/8/17





Identification/Chief Complaint


Chief Complaint


slurry speak, weakness


Problems:  





Source


Source:  Caregiver, Chart review, Patient





History of Present Illness


History of Present Illness





HPI


HPI


46-year-old male with a history of type 2 diabetes hypertension and high 

cholesterol presenting to the emergency department today with generalized 

weakness and slurred speech today.


Pt is a poor historian. He said he has been taking zoloft for a long time, 

recently started taking diazempam and burspirone this week, since then , feels 

weak, less energy, sleep more, low po intake.


 TOday, He was found slurry speech and more confused. He also c/o right side 

weakness for 1 month. 


denies fever,vomiting, but + chills and nausea. 


also use [place].





Past Medical History


Cardiovascular:  HTN, Hyperlipidemia


GI:  GERD


Renal/:  Other


Endocrine:  Diabetes





Past Surgical History


Past Surgical History


 lithotripsy, back,CYSTOSCOPY W/ KIDENY STONE REMOVAL & STENT





Family History


Family History:  No Significant





Social History


Smoke:  No


ALCOHOL:  rare





Current Problem List


Problem List


 Problems


Medical Problems:


(1) Confusion


Status: Acute  





(2) Weakness


Status: Acute  











Current Medications


Current Medications





 Current Medications








 Medications


  (Trade)  Dose


 Ordered  Sig/Apple  Start Time


 Stop Time Status Last Admin


Dose Admin


 


 Morphine Sulfate  2 mg  PRN Q2HR  PRN  4/8/17 18:45


 4/9/17 18:44   


 


 


 Ondansetron HCl


  (Zofran)  4 mg  PRN Q8HRS  PRN  4/8/17 18:45


 4/9/17 18:44   


 


 


 Sodium Chloride


  (Iv Sodium


 Chloride 0.9%


 1000ml Bag)  1,000 ml @ 


 1,000 mls/hr  1X  ONCE  4/8/17 18:30


 4/8/17 19:29  4/8/17 18:40


1,000 MLS/HR











Allergies


Allergies





 Allergies








Coded Allergies Type Severity Reaction Last Updated Verified


 


  Sulfa (Sulfonamide Antibiotics) Allergy Severe WARD RENESTINA SYNDROME 10/26/

16 Yes











ROS


Review of System


CONSTITUTIONAL:        No fever or chills


EYES:                          No recent changes


SKIN:               No rash or itching


CARDIOVASCULAR:     No chest pain, syncope, palpitations, or edema


RESPIRATORY:            No SOB or cough


GASTROINTESTINAL:    No nausea, vomiting or abdominal pain


NEUROLOGICAL:          No headaches or weakness


ENDOCRINE:               No cold or heat intolerance


GENITOURINARY:         No urgency or frequency of urination


MUSCULOSKELETAL:   No back pain or joint pain


LYMPHATICS:               No enlarged lymph nodes


PSYCHIATRIC:              No anxiety or depression





Physical Exam


Physical Exam


GEN.:    No apparent distress.  Alert and oriented. drowsy


HEENT:    Head is normocephalic, atraumatic


NECK:    Supple.  


LUNGS:    Clear to auscultation.


HEART:    RRR, S1, S2 present.  Peripheral pulses intact


ABDOMEN:    Soft, nontender.  Positive bowel sounds.


EXTREMITIES:    Without any cyanosis.    


NEUROLOGIC:     Normal speech, normal tone


PSYCHIATRIC:    Normal affect, normal mood.


SKIN:   No ulcerations





Vitals


Vitals





 Vital Signs








  Date Time  Temp Pulse Resp B/P Pulse Ox O2 Delivery O2 Flow Rate FiO2


 


4/8/17 17:45 97.7 107 20 105/74 97 Room Air  





 97.7       











Labs


Labs





Laboratory Tests








Test


  4/8/17


18:25


 


White Blood Count


  7.2x10^3/uL


(4.0-11.0)


 


Red Blood Count


  4.20x10^6/uL


(4.30-5.70)


 


Hemoglobin


  13.6g/dL


(13.0-17.5)


 


Hematocrit


  39.6%


(39.0-53.0)


 


Mean Corpuscular Volume 94fL () 


 


Mean Corpuscular Hemoglobin 32pg (25-35) 


 


Mean Corpuscular Hemoglobin


Concent 34g/dL (31-37) 


 


 


Red Cell Distribution Width


  13.3%


(11.5-14.5)


 


Platelet Count


  227x10^3/uL


(140-400)


 


Neutrophils (%) (Auto) 66% (31-73) 


 


Lymphocytes (%) (Auto) 23% (24-48) 


 


Monocytes (%) (Auto) 8% (0-9) 


 


Eosinophils (%) (Auto) 4% (0-3) 


 


Basophils (%) (Auto) 0% (0-3) 


 


Neutrophils # (Auto)


  4.7x10^3uL


(1.8-7.7)


 


Lymphocytes # (Auto)


  1.6x10^3/uL


(1.0-4.8)


 


Monocytes # (Auto)


  0.6x10^3/uL


(0.0-1.1)


 


Eosinophils # (Auto)


  0.3x10^3/uL


(0.0-0.7)


 


Basophils # (Auto)


  0.0x10^3/uL


(0.0-0.2)








Laboratory Tests








Test


  4/8/17


18:25


 


White Blood Count


  7.2x10^3/uL


(4.0-11.0)


 


Red Blood Count


  4.20x10^6/uL


(4.30-5.70)


 


Hemoglobin


  13.6g/dL


(13.0-17.5)


 


Hematocrit


  39.6%


(39.0-53.0)


 


Mean Corpuscular Volume 94fL () 


 


Mean Corpuscular Hemoglobin 32pg (25-35) 


 


Mean Corpuscular Hemoglobin


Concent 34g/dL (31-37) 


 


 


Red Cell Distribution Width


  13.3%


(11.5-14.5)


 


Platelet Count


  227x10^3/uL


(140-400)


 


Neutrophils (%) (Auto) 66% (31-73) 


 


Lymphocytes (%) (Auto) 23% (24-48) 


 


Monocytes (%) (Auto) 8% (0-9) 


 


Eosinophils (%) (Auto) 4% (0-3) 


 


Basophils (%) (Auto) 0% (0-3) 


 


Neutrophils # (Auto)


  4.7x10^3uL


(1.8-7.7)


 


Lymphocytes # (Auto)


  1.6x10^3/uL


(1.0-4.8)


 


Monocytes # (Auto)


  0.6x10^3/uL


(0.0-1.1)


 


Eosinophils # (Auto)


  0.3x10^3/uL


(0.0-0.7)


 


Basophils # (Auto)


  0.0x10^3/uL


(0.0-0.2)











VTE Prophylaxis Ordered


VTE Prophylaxis Devices:  Yes


VTE Pharmacological Prophylaxi:  Yes





Assessment/Plan


Assessment/Plan





1. AMS, slurry speech, confusion , 2/2 interaction of polypharm


2. drug abuse with marijuana


3. HTN


4. dm2


5. anxiety


5. gerd


7. HLD


8. right side weakness 1 month, subjective





plan:


1. brain mri


2. neuro consult


2. need home meds


4. avoid sedative meds


ssi for now


dvt ppx








CHRISTOS BURRIS MD Apr 8, 2017 19:13

## 2017-04-08 NOTE — RAD
--------------------PQRS STATEMENT------------------- 

One or more of the following individualized dose reduction techniques were

utilized for this study: 

1.Automated exposure control 

2.Adjustment of the mA and/or kV according to patient size 

3.Use of iterative reconstruction technique 

------------------------------------------------------------------ 

CT HEAD 

Indication: WEAKNESS, SLURRED SPEECH, MEMORY LOSS X1WEEK<BR>NO 

PRIORSReason: weakness / Spl. Instructions: / History: 

COMPARISON: None 

TECHNIQUE: 5 mm contiguous axial images were obtained from the skull base 

to the vertex in both bone and soft tissue algorithm. 

FINDINGS: 

No abnormal attenuation within the brain parenchyma. No evidence of acute 

intracranial hemorrhage. No extra-axial fluid collections. No mass effect 

or midline shift.Ventricular size is appropriate. Basal cisterns are 

patent. No fractures identified. Globes and orbits are within normal 

limits. Paranasal sinuses and mastoid air cells are clear. 

 

IMPRESSION: 

 

No acute intracranial abnormality. 

 

 

 

 

Electronically signed by: Suman Maria (Apr 08, 2017 18:50:28)

## 2017-04-09 VITALS — DIASTOLIC BLOOD PRESSURE: 65 MMHG | SYSTOLIC BLOOD PRESSURE: 101 MMHG

## 2017-04-09 VITALS — DIASTOLIC BLOOD PRESSURE: 86 MMHG | SYSTOLIC BLOOD PRESSURE: 134 MMHG

## 2017-04-09 VITALS — DIASTOLIC BLOOD PRESSURE: 84 MMHG | SYSTOLIC BLOOD PRESSURE: 131 MMHG

## 2017-04-09 LAB
ANION GAP SERPL CALC-SCNC: 10 MMOL/L (ref 6–14)
BASOPHILS # BLD AUTO: 0.1 X10^3/UL (ref 0–0.2)
BASOPHILS NFR BLD: 2 % (ref 0–3)
BUN SERPL-MCNC: 17 MG/DL (ref 8–26)
CALCIUM SERPL-MCNC: 8.7 MG/DL (ref 8.5–10.1)
CHLORIDE SERPL-SCNC: 107 MMOL/L (ref 98–107)
CO2 SERPL-SCNC: 26 MMOL/L (ref 21–32)
CREAT SERPL-MCNC: 1.4 MG/DL (ref 0.7–1.3)
EOSINOPHIL NFR BLD: 5 % (ref 0–3)
ERYTHROCYTE [DISTWIDTH] IN BLOOD BY AUTOMATED COUNT: 13.7 % (ref 11.5–14.5)
GFR SERPLBLD BASED ON 1.73 SQ M-ARVRAT: 52.8 ML/MIN
GLUCOSE SERPL-MCNC: 156 MG/DL (ref 70–99)
HCT VFR BLD CALC: 37.1 % (ref 39–53)
HGB BLD-MCNC: 12.6 G/DL (ref 13–17.5)
LYMPHOCYTES # BLD: 1.8 X10^3/UL (ref 1–4.8)
LYMPHOCYTES NFR BLD AUTO: 28 % (ref 24–48)
MCH RBC QN AUTO: 32 PG (ref 25–35)
MCHC RBC AUTO-ENTMCNC: 34 G/DL (ref 31–37)
MCV RBC AUTO: 95 FL (ref 79–100)
MONOCYTES NFR BLD: 8 % (ref 0–9)
NEUTROPHILS NFR BLD AUTO: 58 % (ref 31–73)
PLATELET # BLD AUTO: 207 X10^3/UL (ref 140–400)
POTASSIUM SERPL-SCNC: 4.1 MMOL/L (ref 3.5–5.1)
RBC # BLD AUTO: 3.92 X10^6/UL (ref 4.3–5.7)
SODIUM SERPL-SCNC: 143 MMOL/L (ref 136–145)
WBC # BLD AUTO: 6.2 X10^3/UL (ref 4–11)

## 2017-04-09 RX ADMIN — INSULIN ASPART SCH UNITS: 100 INJECTION, SOLUTION INTRAVENOUS; SUBCUTANEOUS at 11:30

## 2017-04-09 RX ADMIN — INSULIN ASPART SCH UNITS: 100 INJECTION, SOLUTION INTRAVENOUS; SUBCUTANEOUS at 07:30

## 2017-04-09 NOTE — PDOC3
Discharge Summary [facility]


Date of Admission:  Apr 8, 2017


Discharge Date:  Apr 9, 2017


Admitting Diagnosis


1. AMS, slurry speech, confusion , 2/2 interaction of polypharm


2. drug abuse with marijuana


3. HTN


4. dm2


5. anxiety


5. gerd


7. HLD


8. right side weakness 1 month, subjective


9. MONIKA, vasomotor


Problems:  


Final Diagnosis


 Problems





CONSULTS


neuro


Brief Hospital Course


46-year-old male with a history of type 2 diabetes hypertension and high 

cholesterol presenting to the emergency department today with generalized 

weakness and slurred speech today.


Pt is a poor historian. He said he has been taking zoloft for a long time, 

recently started taking diazempam and burspirone this week, since then , feels 

weak, less energy, sleep more, low po intake.


 TOday, He was found slurry speech and more confused. He also c/o right side 

weakness for 1 month. 


denies fever,vomiting, but + chills and nausea. 


also use [place].





MRI brain neg for stroke. Pt is drowsy, sleepy, arousable and answer questions 

ok. it is likely 2/2 multiple meds including gabapentin, trazodone, dizapem, 

zoloft, wellbutrin, and marijuana. Cr higher than baseline, on NSAIDS and 

dehydration. ideally get ivf and dc tmr. but pt refused IVF and wants to go 

home today. Neuro is ok for dc.


dc time 35min


Patient History:  


Patient reports no known family medical history.


Problems:  


Disposition


home


CONDITION AT DISCHARGE:  Stable


Diet


regular


Scheduled


Atorvastatin Calcium (Atorvastatin Calcium) 40 MG PO HS (Reported) 


Gabapentin (Gabapentin) 1 PO QHS (Reported) 


Insulin Glargine,Hum.rec.anlog (Toujeo Solostar) 20 UNIT SQ DAILY (Reported) 


Lisinopril (Lisinopril) 1 PO DAILY (Reported) 


Omeprazole (Omeprazole) 1 CAP PO DAILY (Reported) 


Potassium Chloride (Potassium Chloride) 20 MEQ PO DAILY (Reported) 


Rosuvastatin Calcium (Rosuvastatin Calcium) 1 PO QHS (Reported) 


Sertraline Hcl (Sertraline Hcl) 100 MG PO DAILY (Reported) 


Sitagliptin Phosphate (Januvia) 100 MG PO DAILY (Reported) 


Tamsulosin Hcl (Flomax) 0.4 MG PO DAILY 





Scheduled PRN


Hydrocodone/Acetaminophen (Lortab 5-325 mg Tablet) 1 TAB PO PRN Q6HRS PRN PRN 

PAIN 


Hyoscyamine Sulfate (Hyoscyamine Sulfate) 1 SL PRN Q4HRS PRN PRN MUSCLE SPASMS (

Reported) 


Ondansetron Hcl (Zofran) 1 TAB PO Q8HRS PRN PRN NAUSEA/VOMITING 


Oxycodone/Apap 7.5-325 (Percocet 7.5-325 Mg Tablet) 1 TAB PO PRN Q6HRS PRN PRN 

PAIN 





Miscellaneous Medications


Meclizine Hcl (Meclizine Hcl) (Reported) 


Metformin Hcl (Metformin Hcl Er) (Reported) 


Sennosides (Senna) (Reported) 





Discontinued Medications


Bupropion Hcl (Bupropion Xl) (Reported) 


Bupropion Hcl (Bupropion Xl) 1 TAB PO DAILYWBKFT (Reported) 


Cyclobenzaprine Hcl (Cyclobenzaprine Hcl) 10 MG PO TID (Reported) 


Diazepam (Diazepam) 1 PO PRN BID PRN PRN ANXIETY / AGITATION (Reported) 


Naproxen (Naproxen) 250 MG PO PRN BID PRN PRN PAIN 


Omeprazole (Omeprazole) 1 TAB PO DAILY (Reported) 


Trazodone Hcl (Trazodone Hcl) (Reported) 


Follow Up


pcp and neuro in 2 weeks








CHRISTOS BURRIS MD Apr 9, 2017 14:03

## 2017-04-09 NOTE — EKG
[facility]

              [address] [place] [address]

Test Date:    2017               Test Time:    18:24:46

Pat Name:     MURALI LEDESMA             Department:   

Patient ID:   [id number]           Room:          

Gender:       M                        Technician:   

:          [id number]               Requested By: BRYANT BASS

Order Number: [id number]            Reading MD:     

                                 Measurements

Intervals                              Axis          

Rate:         86                       P:            38

AL:           156                      QRS:          15

QRSD:         92                       T:            19

QT:           352                                    

QTc:          424                                    

                           Interpretive Statements

SINUS RHYTHM

NO SPECIFIC ECG ABNORMALITIES

RI6.01

No previous ECG available for comparison

## 2017-04-09 NOTE — RAD
Portable AP upright view CXR:



Clinical indications: CVA. Weakness.



Comparison: October 25, 2016.



Findings: No acute lung infiltrate or pleural effusion or pulmonary edema or

lung mass or pneumothorax is seen.  The heart size, pulmonary vasculature,

mediastinum and both nadia are unremarkable. 



Impression: No acute radiographic abnormality is seen.

## 2017-04-09 NOTE — PDOC
PROGRESS NOTES


Chief Complaint


Chief Complaint








1. AMS, slurry speech, confusion , 2/2 interaction of polypharm


2. drug abuse with marijuana


3. HTN


4. dm2


5. anxiety


5. gerd


7. HLD


8. right side weakness 1 month, subjective


9. MONIKA, vasomotor





plan:


1. brain mri neg


2. neuro consult pending


2. need home meds


4. avoid sedative meds, hold most sedative meds, only restart zoloft for now


hold naproxen, ivf 


ssi for now


lantus 10u qhs, SSI


dvt ppx


dc  tmr





History of Present Illness


History of Present Illness


cr 1.4





still very sleepy





Vitals


Vitals





 Vital Signs








  Date Time  Temp Pulse Resp B/P Pulse Ox O2 Delivery O2 Flow Rate FiO2


 


4/9/17 12:37  93  134/86    


 


4/9/17 10:37 97.5  20  98 Room Air  





 97.5       











Physical Exam


General:  Alert, Oriented X3, Cooperative


Heart:  Regular rate, Normal S1


Lungs:  Clear


Abdomen:  Normal bowel sounds, Soft


Extremities:  No clubbing, No cyanosis





Labs


LABS





Laboratory Tests








Test


  4/8/17


18:25 4/8/17


21:10 4/8/17


21:15 4/9/17


04:34


 


White Blood Count


  7.2x10^3/uL


(4.0-11.0) 


  


  


 


 


Red Blood Count


  4.20x10^6/uL


(4.30-5.70) 


  


  


 


 


Hemoglobin


  13.6g/dL


(13.0-17.5) 


  


  


 


 


Hematocrit


  39.6%


(39.0-53.0) 


  


  


 


 


Mean Corpuscular Volume 94fL ()    


 


Mean Corpuscular Hemoglobin 32pg (25-35)    


 


Mean Corpuscular Hemoglobin


Concent 34g/dL (31-37) 


  


  


  


 


 


Red Cell Distribution Width


  13.3%


(11.5-14.5) 


  


  


 


 


Platelet Count


  227x10^3/uL


(140-400) 


  


  


 


 


Neutrophils (%) (Auto) 66% (31-73)    


 


Lymphocytes (%) (Auto) 23% (24-48)    


 


Monocytes (%) (Auto) 8% (0-9)    


 


Eosinophils (%) (Auto) 4% (0-3)    


 


Basophils (%) (Auto) 0% (0-3)    


 


Neutrophils # (Auto)


  4.7x10^3uL


(1.8-7.7) 


  


  


 


 


Lymphocytes # (Auto)


  1.6x10^3/uL


(1.0-4.8) 


  


  


 


 


Monocytes # (Auto)


  0.6x10^3/uL


(0.0-1.1) 


  


  


 


 


Eosinophils # (Auto)


  0.3x10^3/uL


(0.0-0.7) 


  


  


 


 


Basophils # (Auto)


  0.0x10^3/uL


(0.0-0.2) 


  


  


 


 


Sodium Level


  142mmol/L


(136-145) 


  


  143mmol/L


(136-145)


 


Potassium Level


  4.1mmol/L


(3.5-5.1) 


  


  4.1mmol/L


(3.5-5.1)


 


Chloride Level


  105mmol/L


() 


  


  107mmol/L


()


 


Carbon Dioxide Level


  25mmol/L


(21-32) 


  


  26mmol/L


(21-32)


 


Anion Gap 12 (6-14)    10 (6-14) 


 


Blood Urea Nitrogen 18mg/dL (8-26)    17mg/dL (8-26) 


 


Creatinine


  1.4mg/dL


(0.7-1.3) 


  


  1.4mg/dL


(0.7-1.3)


 


Estimated GFR


(Cockcroft-Gault) 52.8 


  


  


  52.8 


 


 


Glucose Level


  178mg/dL


(70-99) 


  


  156mg/dL


(70-99)


 


Serum Osmolality


  295mOsm/Kg


(279-304) 


  


  


 


 


Lactic Acid Level


  2.2mmol/L


(0.4-2.0) 


  


  


 


 


Calcium Level


  9.5mg/dL


(8.5-10.1) 


  


  8.7mg/dL


(8.5-10.1)


 


Total Bilirubin


  0.5mg/dL


(0.2-1.0) 


  


  


 


 


Direct Bilirubin


  0.1mg/dL


(0.0-0.2) 


  


  


 


 


Aspartate Amino Transf


(AST/SGOT) 21U/L (15-37) 


  


  


  


 


 


Alanine Aminotransferase


(ALT/SGPT) 29U/L (16-63) 


  


  


  


 


 


Alkaline Phosphatase


  177U/L


() 


  


  


 


 


Troponin I Quantitative


  < 0.017ng/mL


(0.000-0.055) 


  


  


 


 


NT-Pro-B-Type Natriuretic


Peptide 100pg/mL


(0-124) 


  


  


 


 


Total Protein


  7.5g/dL


(6.4-8.2) 


  


  


 


 


Albumin


  3.6g/dL


(3.4-5.0) 


  


  


 


 


Lipase 93U/L ()    


 


Ethyl Alcohol Level


  < 10mg/dL


(0-10) 


  


  


 


 


Urine Collection Type  Unknown   


 


Urine Color  Yellow   


 


Urine Clarity  Clear   


 


Urine pH  5.5   


 


Urine Specific Gravity  1.020   


 


Urine Protein


  


  Negativemg/dL


(NEG-TRACE) 


  


 


 


Urine Glucose (UA)


  


  Negativemg/dL


(NEG) 


  


 


 


Urine Ketones (Stick)


  


  Negativemg/dL


(NEG) 


  


 


 


Urine Blood  Large (NEG)   


 


Urine Nitrite  Negative (NEG)   


 


Urine Bilirubin  Negative (NEG)   


 


Urine Urobilinogen Dipstick


  


  0.2mg/dL (0.2


mg/dL) 


  


 


 


Urine Leukocyte Esterase  Negative (NEG)   


 


Urine RBC


  


  20-40/HPF


(0-2) 


  


 


 


Urine WBC  1-4/HPF (0-4)   


 


Urine Squamous Epithelial


Cells 


  Few/LPF 


  


  


 


 


Urine Bacteria


  


  Few/HPF


(0-FEW) 


  


 


 


Urine Hyaline Casts  Many/HPF   


 


Urine Mucus  Mod/LPF   


 


Urine Opiates Screen  Neg (NEG)   


 


Urine Methadone Screen  Neg (NEG)   


 


Urine Barbiturates  Neg (NEG)   


 


Urine Phencyclidine Screen  Neg (NEG)   


 


Urine


Amphetamine/Methamphetamine 


  Neg (NEG) 


  


  


 


 


Urine Benzodiazepines Screen  Pos (NEG)   


 


Urine Cocaine Screen  Neg (NEG)   


 


Urine Cannabinoids Screen  Pos (NEG)   


 


Urine Ethyl Alcohol  Neg (NEG)   


 


Glucose (Fingerstick)


  


  


  104mg/dL


(70-99) 


 














Test


  4/9/17


04:39 4/9/17


07:42 4/9/17


11:47 


 


 


White Blood Count


  6.2x10^3/uL


(4.0-11.0) 


  


  


 


 


Red Blood Count


  3.92x10^6/uL


(4.30-5.70) 


  


  


 


 


Hemoglobin


  12.6g/dL


(13.0-17.5) 


  


  


 


 


Hematocrit


  37.1%


(39.0-53.0) 


  


  


 


 


Mean Corpuscular Volume 95fL ()    


 


Mean Corpuscular Hemoglobin 32pg (25-35)    


 


Mean Corpuscular Hemoglobin


Concent 34g/dL (31-37) 


  


  


  


 


 


Red Cell Distribution Width


  13.7%


(11.5-14.5) 


  


  


 


 


Platelet Count


  207x10^3/uL


(140-400) 


  


  


 


 


Neutrophils (%) (Auto) 58% (31-73)    


 


Lymphocytes (%) (Auto) 28% (24-48)    


 


Monocytes (%) (Auto) 8% (0-9)    


 


Eosinophils (%) (Auto) 5% (0-3)    


 


Basophils (%) (Auto) 2% (0-3)    


 


Neutrophils # (Auto)


  3.6x10^3uL


(1.8-7.7) 


  


  


 


 


Lymphocytes # (Auto)


  1.8x10^3/uL


(1.0-4.8) 


  


  


 


 


Monocytes # (Auto)


  0.5x10^3/uL


(0.0-1.1) 


  


  


 


 


Eosinophils # (Auto)


  0.3x10^3/uL


(0.0-0.7) 


  


  


 


 


Basophils # (Auto)


  0.1x10^3/uL


(0.0-0.2) 


  


  


 


 


Glucose (Fingerstick)


  


  168mg/dL


(70-99) 187mg/dL


(70-99) 


 











Review of Systems


Review of Systems


no fever, chills, sob or chest pain





Assessment and Plan


Assessmemt and Plan


 Problems


Medical Problems:


(1) Confusion


Status: Acute  





(2) Hypovolemia


Status: Acute  





(3) Lactic acidosis


Status: Acute  





(4) Weakness


Status: Acute  








Problems:  





Comment


Review of Relevant


I have reviewed the following items lin (where applicable) has been applied.


Labs





Laboratory Tests








Test


  4/8/17


18:25 4/8/17


21:10 4/8/17


21:15 4/9/17


04:34


 


White Blood Count


  7.2x10^3/uL


(4.0-11.0) 


  


  


 


 


Red Blood Count


  4.20x10^6/uL


(4.30-5.70) 


  


  


 


 


Hemoglobin


  13.6g/dL


(13.0-17.5) 


  


  


 


 


Hematocrit


  39.6%


(39.0-53.0) 


  


  


 


 


Mean Corpuscular Volume 94fL ()    


 


Mean Corpuscular Hemoglobin 32pg (25-35)    


 


Mean Corpuscular Hemoglobin


Concent 34g/dL (31-37) 


  


  


  


 


 


Red Cell Distribution Width


  13.3%


(11.5-14.5) 


  


  


 


 


Platelet Count


  227x10^3/uL


(140-400) 


  


  


 


 


Neutrophils (%) (Auto) 66% (31-73)    


 


Lymphocytes (%) (Auto) 23% (24-48)    


 


Monocytes (%) (Auto) 8% (0-9)    


 


Eosinophils (%) (Auto) 4% (0-3)    


 


Basophils (%) (Auto) 0% (0-3)    


 


Neutrophils # (Auto)


  4.7x10^3uL


(1.8-7.7) 


  


  


 


 


Lymphocytes # (Auto)


  1.6x10^3/uL


(1.0-4.8) 


  


  


 


 


Monocytes # (Auto)


  0.6x10^3/uL


(0.0-1.1) 


  


  


 


 


Eosinophils # (Auto)


  0.3x10^3/uL


(0.0-0.7) 


  


  


 


 


Basophils # (Auto)


  0.0x10^3/uL


(0.0-0.2) 


  


  


 


 


Sodium Level


  142mmol/L


(136-145) 


  


  143mmol/L


(136-145)


 


Potassium Level


  4.1mmol/L


(3.5-5.1) 


  


  4.1mmol/L


(3.5-5.1)


 


Chloride Level


  105mmol/L


() 


  


  107mmol/L


()


 


Carbon Dioxide Level


  25mmol/L


(21-32) 


  


  26mmol/L


(21-32)


 


Anion Gap 12 (6-14)    10 (6-14) 


 


Blood Urea Nitrogen 18mg/dL (8-26)    17mg/dL (8-26) 


 


Creatinine


  1.4mg/dL


(0.7-1.3) 


  


  1.4mg/dL


(0.7-1.3)


 


Estimated GFR


(Cockcroft-Gault) 52.8 


  


  


  52.8 


 


 


Glucose Level


  178mg/dL


(70-99) 


  


  156mg/dL


(70-99)


 


Serum Osmolality


  295mOsm/Kg


(279-304) 


  


  


 


 


Lactic Acid Level


  2.2mmol/L


(0.4-2.0) 


  


  


 


 


Calcium Level


  9.5mg/dL


(8.5-10.1) 


  


  8.7mg/dL


(8.5-10.1)


 


Total Bilirubin


  0.5mg/dL


(0.2-1.0) 


  


  


 


 


Direct Bilirubin


  0.1mg/dL


(0.0-0.2) 


  


  


 


 


Aspartate Amino Transf


(AST/SGOT) 21U/L (15-37) 


  


  


  


 


 


Alanine Aminotransferase


(ALT/SGPT) 29U/L (16-63) 


  


  


  


 


 


Alkaline Phosphatase


  177U/L


() 


  


  


 


 


Troponin I Quantitative


  < 0.017ng/mL


(0.000-0.055) 


  


  


 


 


NT-Pro-B-Type Natriuretic


Peptide 100pg/mL


(0-124) 


  


  


 


 


Total Protein


  7.5g/dL


(6.4-8.2) 


  


  


 


 


Albumin


  3.6g/dL


(3.4-5.0) 


  


  


 


 


Lipase 93U/L ()    


 


Ethyl Alcohol Level


  < 10mg/dL


(0-10) 


  


  


 


 


Urine Collection Type  Unknown   


 


Urine Color  Yellow   


 


Urine Clarity  Clear   


 


Urine pH  5.5   


 


Urine Specific Gravity  1.020   


 


Urine Protein


  


  Negativemg/dL


(NEG-TRACE) 


  


 


 


Urine Glucose (UA)


  


  Negativemg/dL


(NEG) 


  


 


 


Urine Ketones (Stick)


  


  Negativemg/dL


(NEG) 


  


 


 


Urine Blood  Large (NEG)   


 


Urine Nitrite  Negative (NEG)   


 


Urine Bilirubin  Negative (NEG)   


 


Urine Urobilinogen Dipstick


  


  0.2mg/dL (0.2


mg/dL) 


  


 


 


Urine Leukocyte Esterase  Negative (NEG)   


 


Urine RBC


  


  20-40/HPF


(0-2) 


  


 


 


Urine WBC  1-4/HPF (0-4)   


 


Urine Squamous Epithelial


Cells 


  Few/LPF 


  


  


 


 


Urine Bacteria


  


  Few/HPF


(0-FEW) 


  


 


 


Urine Hyaline Casts  Many/HPF   


 


Urine Mucus  Mod/LPF   


 


Urine Opiates Screen  Neg (NEG)   


 


Urine Methadone Screen  Neg (NEG)   


 


Urine Barbiturates  Neg (NEG)   


 


Urine Phencyclidine Screen  Neg (NEG)   


 


Urine


Amphetamine/Methamphetamine 


  Neg (NEG) 


  


  


 


 


Urine Benzodiazepines Screen  Pos (NEG)   


 


Urine Cocaine Screen  Neg (NEG)   


 


Urine Cannabinoids Screen  Pos (NEG)   


 


Urine Ethyl Alcohol  Neg (NEG)   


 


Glucose (Fingerstick)


  


  


  104mg/dL


(70-99) 


 














Test


  4/9/17


04:39 4/9/17


07:42 4/9/17


11:47 


 


 


White Blood Count


  6.2x10^3/uL


(4.0-11.0) 


  


  


 


 


Red Blood Count


  3.92x10^6/uL


(4.30-5.70) 


  


  


 


 


Hemoglobin


  12.6g/dL


(13.0-17.5) 


  


  


 


 


Hematocrit


  37.1%


(39.0-53.0) 


  


  


 


 


Mean Corpuscular Volume 95fL ()    


 


Mean Corpuscular Hemoglobin 32pg (25-35)    


 


Mean Corpuscular Hemoglobin


Concent 34g/dL (31-37) 


  


  


  


 


 


Red Cell Distribution Width


  13.7%


(11.5-14.5) 


  


  


 


 


Platelet Count


  207x10^3/uL


(140-400) 


  


  


 


 


Neutrophils (%) (Auto) 58% (31-73)    


 


Lymphocytes (%) (Auto) 28% (24-48)    


 


Monocytes (%) (Auto) 8% (0-9)    


 


Eosinophils (%) (Auto) 5% (0-3)    


 


Basophils (%) (Auto) 2% (0-3)    


 


Neutrophils # (Auto)


  3.6x10^3uL


(1.8-7.7) 


  


  


 


 


Lymphocytes # (Auto)


  1.8x10^3/uL


(1.0-4.8) 


  


  


 


 


Monocytes # (Auto)


  0.5x10^3/uL


(0.0-1.1) 


  


  


 


 


Eosinophils # (Auto)


  0.3x10^3/uL


(0.0-0.7) 


  


  


 


 


Basophils # (Auto)


  0.1x10^3/uL


(0.0-0.2) 


  


  


 


 


Glucose (Fingerstick)


  


  168mg/dL


(70-99) 187mg/dL


(70-99) 


 








Laboratory Tests








Test


  4/8/17


18:25 4/8/17


21:10 4/8/17


21:15 4/9/17


04:34


 


White Blood Count


  7.2x10^3/uL


(4.0-11.0) 


  


  


 


 


Red Blood Count


  4.20x10^6/uL


(4.30-5.70) 


  


  


 


 


Hemoglobin


  13.6g/dL


(13.0-17.5) 


  


  


 


 


Hematocrit


  39.6%


(39.0-53.0) 


  


  


 


 


Mean Corpuscular Volume 94fL ()    


 


Mean Corpuscular Hemoglobin 32pg (25-35)    


 


Mean Corpuscular Hemoglobin


Concent 34g/dL (31-37) 


  


  


  


 


 


Red Cell Distribution Width


  13.3%


(11.5-14.5) 


  


  


 


 


Platelet Count


  227x10^3/uL


(140-400) 


  


  


 


 


Neutrophils (%) (Auto) 66% (31-73)    


 


Lymphocytes (%) (Auto) 23% (24-48)    


 


Monocytes (%) (Auto) 8% (0-9)    


 


Eosinophils (%) (Auto) 4% (0-3)    


 


Basophils (%) (Auto) 0% (0-3)    


 


Neutrophils # (Auto)


  4.7x10^3uL


(1.8-7.7) 


  


  


 


 


Lymphocytes # (Auto)


  1.6x10^3/uL


(1.0-4.8) 


  


  


 


 


Monocytes # (Auto)


  0.6x10^3/uL


(0.0-1.1) 


  


  


 


 


Eosinophils # (Auto)


  0.3x10^3/uL


(0.0-0.7) 


  


  


 


 


Basophils # (Auto)


  0.0x10^3/uL


(0.0-0.2) 


  


  


 


 


Sodium Level


  142mmol/L


(136-145) 


  


  143mmol/L


(136-145)


 


Potassium Level


  4.1mmol/L


(3.5-5.1) 


  


  4.1mmol/L


(3.5-5.1)


 


Chloride Level


  105mmol/L


() 


  


  107mmol/L


()


 


Carbon Dioxide Level


  25mmol/L


(21-32) 


  


  26mmol/L


(21-32)


 


Anion Gap 12 (6-14)    10 (6-14) 


 


Blood Urea Nitrogen 18mg/dL (8-26)    17mg/dL (8-26) 


 


Creatinine


  1.4mg/dL


(0.7-1.3) 


  


  1.4mg/dL


(0.7-1.3)


 


Estimated GFR


(Cockcroft-Gault) 52.8 


  


  


  52.8 


 


 


Glucose Level


  178mg/dL


(70-99) 


  


  156mg/dL


(70-99)


 


Serum Osmolality


  295mOsm/Kg


(279-304) 


  


  


 


 


Lactic Acid Level


  2.2mmol/L


(0.4-2.0) 


  


  


 


 


Calcium Level


  9.5mg/dL


(8.5-10.1) 


  


  8.7mg/dL


(8.5-10.1)


 


Total Bilirubin


  0.5mg/dL


(0.2-1.0) 


  


  


 


 


Direct Bilirubin


  0.1mg/dL


(0.0-0.2) 


  


  


 


 


Aspartate Amino Transf


(AST/SGOT) 21U/L (15-37) 


  


  


  


 


 


Alanine Aminotransferase


(ALT/SGPT) 29U/L (16-63) 


  


  


  


 


 


Alkaline Phosphatase


  177U/L


() 


  


  


 


 


Troponin I Quantitative


  < 0.017ng/mL


(0.000-0.055) 


  


  


 


 


NT-Pro-B-Type Natriuretic


Peptide 100pg/mL


(0-124) 


  


  


 


 


Total Protein


  7.5g/dL


(6.4-8.2) 


  


  


 


 


Albumin


  3.6g/dL


(3.4-5.0) 


  


  


 


 


Lipase 93U/L ()    


 


Ethyl Alcohol Level


  < 10mg/dL


(0-10) 


  


  


 


 


Urine Collection Type  Unknown   


 


Urine Color  Yellow   


 


Urine Clarity  Clear   


 


Urine pH  5.5   


 


Urine Specific Gravity  1.020   


 


Urine Protein


  


  Negativemg/dL


(NEG-TRACE) 


  


 


 


Urine Glucose (UA)


  


  Negativemg/dL


(NEG) 


  


 


 


Urine Ketones (Stick)


  


  Negativemg/dL


(NEG) 


  


 


 


Urine Blood  Large (NEG)   


 


Urine Nitrite  Negative (NEG)   


 


Urine Bilirubin  Negative (NEG)   


 


Urine Urobilinogen Dipstick


  


  0.2mg/dL (0.2


mg/dL) 


  


 


 


Urine Leukocyte Esterase  Negative (NEG)   


 


Urine RBC


  


  20-40/HPF


(0-2) 


  


 


 


Urine WBC  1-4/HPF (0-4)   


 


Urine Squamous Epithelial


Cells 


  Few/LPF 


  


  


 


 


Urine Bacteria


  


  Few/HPF


(0-FEW) 


  


 


 


Urine Hyaline Casts  Many/HPF   


 


Urine Mucus  Mod/LPF   


 


Urine Opiates Screen  Neg (NEG)   


 


Urine Methadone Screen  Neg (NEG)   


 


Urine Barbiturates  Neg (NEG)   


 


Urine Phencyclidine Screen  Neg (NEG)   


 


Urine


Amphetamine/Methamphetamine 


  Neg (NEG) 


  


  


 


 


Urine Benzodiazepines Screen  Pos (NEG)   


 


Urine Cocaine Screen  Neg (NEG)   


 


Urine Cannabinoids Screen  Pos (NEG)   


 


Urine Ethyl Alcohol  Neg (NEG)   


 


Glucose (Fingerstick)


  


  


  104mg/dL


(70-99) 


 














Test


  4/9/17


04:39 4/9/17


07:42 4/9/17


11:47 


 


 


White Blood Count


  6.2x10^3/uL


(4.0-11.0) 


  


  


 


 


Red Blood Count


  3.92x10^6/uL


(4.30-5.70) 


  


  


 


 


Hemoglobin


  12.6g/dL


(13.0-17.5) 


  


  


 


 


Hematocrit


  37.1%


(39.0-53.0) 


  


  


 


 


Mean Corpuscular Volume 95fL ()    


 


Mean Corpuscular Hemoglobin 32pg (25-35)    


 


Mean Corpuscular Hemoglobin


Concent 34g/dL (31-37) 


  


  


  


 


 


Red Cell Distribution Width


  13.7%


(11.5-14.5) 


  


  


 


 


Platelet Count


  207x10^3/uL


(140-400) 


  


  


 


 


Neutrophils (%) (Auto) 58% (31-73)    


 


Lymphocytes (%) (Auto) 28% (24-48)    


 


Monocytes (%) (Auto) 8% (0-9)    


 


Eosinophils (%) (Auto) 5% (0-3)    


 


Basophils (%) (Auto) 2% (0-3)    


 


Neutrophils # (Auto)


  3.6x10^3uL


(1.8-7.7) 


  


  


 


 


Lymphocytes # (Auto)


  1.8x10^3/uL


(1.0-4.8) 


  


  


 


 


Monocytes # (Auto)


  0.5x10^3/uL


(0.0-1.1) 


  


  


 


 


Eosinophils # (Auto)


  0.3x10^3/uL


(0.0-0.7) 


  


  


 


 


Basophils # (Auto)


  0.1x10^3/uL


(0.0-0.2) 


  


  


 


 


Glucose (Fingerstick)


  


  168mg/dL


(70-99) 187mg/dL


(70-99) 


 








Medications





 Current Medications


Sodium Chloride (Iv Sodium Chloride 0.9% 1000ml Bag) 1,000 ml @  1,000 mls/hr 

1X  ONCE IV  Last administered on 4/8/17at 18:40;  Start 4/8/17 at 18:30;  Stop 

4/8/17 at 19:29;  Status DC


Ondansetron HCl (Zofran) 4 mg PRN Q8HRS  PRN IV NAUSEA/VOMITING;  Start 4/8/17 

at 18:45;  Stop 4/8/17 at 19:09;  Status DC


Morphine Sulfate 2 mg PRN Q2HR  PRN IV PAIN;  Start 4/8/17 at 18:45;  Stop 4/9/ 17 at 18:44


Acetaminophen (Tylenol) 650 mg PRN Q6HRS  PRN PO FEVER Last administered on 4/9/ 17at 10:52;  Start 4/8/17 at 19:15


Ondansetron HCl (Zofran) 4 mg PRN Q6HRS  PRN IV NAUSEA/VOMITING;  Start 4/8/17 

at 19:15


Insulin Aspart (Novolog) 0-9 UNITS QIDACHS SQ ;  Start 4/8/17 at 21:00


Dextrose (Dextrose 50%-Water Syringe) 12.5 gm PRN Q15MIN  PRN IV SEE COMMENTS;  

Start 4/8/17 at 19:15


Hydralazine HCl (Apresoline) 10 mg PRN Q4HRS  PRN IVP ELEVATED BP, SEE COMMENTS

;  Start 4/8/17 at 19:15


Gadobutrol (Gadavist) 9 mmol 1X  ONCE IV  Last administered on 4/8/17at 20:11;  

Start 4/8/17 at 20:30;  Stop 4/8/17 at 20:31;  Status DC


Atorvastatin Calcium (Lipitor) 40 mg HS PO ;  Start 4/9/17 at 21:00


Acetaminophen/ Hydrocodone Bitart (Lortab 5/325) 1 tab PRN Q6HRS  PRN PO PAIN;  

Start 4/9/17 at 11:45


Lisinopril (Prinivil) 10 mg DAILY PO  Last administered on 4/9/17at 12:37;  

Start 4/9/17 at 13:00


Naproxen (Naprosyn) 250 mg PRN BID  PRN PO PAIN;  Start 4/9/17 at 11:45


Sennosides (Senna) 8.6 mg PRN BID  PRN PO CONSTIPATION;  Start 4/9/17 at 11:45


Tamsulosin HCl (Flomax) 0.4 mg DAILY PO  Last administered on 4/9/17at 12:37;  

Start 4/9/17 at 13:00


Pantoprazole Sodium (Protonix) 40 mg DAILYAC PO  Last administered on 4/9/17at 

12:38;  Start 4/9/17 at 13:00


Potassium Chloride (Klor-Con) 20 meq DAILYWBKFT PO  Last administered on 4/9/ 17at 12:38;  Start 4/9/17 at 13:00


Sertraline HCl (Zoloft) 100 mg DAILY PO ;  Start 4/9/17 at 13:00


Linagliptin (Tradjenta) 5 mg DAILY PO  Last administered on 4/9/17at 12:37;  

Start 4/9/17 at 13:00


Insulin Detemir (Levemir) 10 units QHS SQ ;  Start 4/9/17 at 21:00





Active Scripts


Active


Lortab 5-325 mg Tablet (Hydrocodone/Acetaminophen) 1 Each Tablet 1 Tab PO PRN 

Q6HRS PRN


Flomax (Tamsulosin Hcl) 0.4 Mg Cap.er.24h 0.4 Mg PO DAILY


Zofran (Ondansetron Hcl) 4 Mg Tablet 1 Tab PO Q8HRS PRN


Percocet 7.5-325 Mg Tablet (Oxycodone/Acetaminophen) 1 Each Tablet 1 Tab PO PRN 

Q6HRS PRN


Naproxen 250 Mg Tablet 250 Mg PO PRN BID PRN


Reported


Potassium Chloride 10 Meq Tablet.er 20 Meq PO DAILY


Omeprazole 40 Mg Capsule.dr 1 Cap PO DAILY


Hyoscyamine Sulfate 0.125 Mg Tab.subl 1 SL PRN Q4HRS PRN


Bupropion Xl (Bupropion Hcl) 150 Mg Tab.er.24h 1 Tab PO DAILYWBKFT


Rosuvastatin Calcium 20 Mg Tablet 1 PO QHS


Trazodone Hcl 50 Mg Tablet   


Gabapentin 100 Mg Capsule 1 PO QHS


Metformin Hcl Er (Metformin Hcl) 1,000 Mg Tab.er.24   


Meclizine Hcl 25 Mg Tablet   


Lisinopril 10 Mg Tablet 1 PO DAILY


Senna (Sennosides) 8.6 Mg Tablet   


Diazepam 5 Mg Tablet 1 PO PRN BID PRN


Toujeo Solostar (Insulin Glargine,Hum.rec.anlog) 300 Unit/1 Ml Insuln.pen 20 

Unit SQ DAILY


Sertraline Hcl 100 Mg Tablet 100 Mg PO DAILY


Januvia (Sitagliptin Phosphate) 100 Mg Tablet 100 Mg PO DAILY


Atorvastatin Calcium 40 Mg Tablet 40 Mg PO HS


Cyclobenzaprine Hcl 10 Mg Tablet 10 Mg PO TID


Vitals/I & O





 Vital Sign - Last 24 Hours








 4/8/17 4/8/17 4/8/17 4/9/17





 17:45 21:00 23:00 02:35


 


Temp 97.7 97.6 97.6 





 97.7 97.6 97.6 


 


Pulse 107 61 61 


 


Resp 20 21 21 


 


B/P 105/74 118/73 118/73 


 


Pulse Ox 97 100 100 


 


O2 Delivery Room Air Room Air Room Air Room Air


 


    





    





 4/9/17 4/9/17 4/9/17 4/9/17





 03:00 07:39 08:06 10:37


 


Temp 98.1 98.1  97.5





 98.1 98.1  97.5


 


Pulse 76 93  93


 


Resp 20 20  20


 


B/P 131/84 101/65  134/86


 


Pulse Ox 98 96  98


 


O2 Delivery Room Air Room Air Room Air Room Air


 


    





    





 4/9/17   





 12:37   


 


Pulse 93   


 


B/P 134/86   














 Intake and Output   


 


 4/8/17 4/8/17 4/9/17





 15:00 23:00 07:00


 


Intake Total  1000 ml 300 ml


 


Output Total   300 ml


 


Balance  1000 ml 0 ml














CHRISTOS BURRIS MD Apr 9, 2017 13:03

## 2017-06-10 ENCOUNTER — HOSPITAL ENCOUNTER (EMERGENCY)
Dept: HOSPITAL 61 - ER | Age: 57
Discharge: HOME | End: 2017-06-10
Payer: COMMERCIAL

## 2017-06-10 VITALS — DIASTOLIC BLOOD PRESSURE: 75 MMHG | SYSTOLIC BLOOD PRESSURE: 138 MMHG

## 2017-06-10 VITALS — BODY MASS INDEX: 29.06 KG/M2 | HEIGHT: 70 IN | WEIGHT: 203 LBS

## 2017-06-10 DIAGNOSIS — R53.1: Primary | ICD-10-CM

## 2017-06-10 DIAGNOSIS — E78.00: ICD-10-CM

## 2017-06-10 DIAGNOSIS — Z87.442: ICD-10-CM

## 2017-06-10 DIAGNOSIS — M79.7: ICD-10-CM

## 2017-06-10 DIAGNOSIS — I10: ICD-10-CM

## 2017-06-10 DIAGNOSIS — F12.10: ICD-10-CM

## 2017-06-10 DIAGNOSIS — E11.9: ICD-10-CM

## 2017-06-10 DIAGNOSIS — K21.9: ICD-10-CM

## 2017-06-10 DIAGNOSIS — G89.29: ICD-10-CM

## 2017-06-10 DIAGNOSIS — F41.9: ICD-10-CM

## 2017-06-10 DIAGNOSIS — Z96.0: ICD-10-CM

## 2017-06-10 DIAGNOSIS — Z88.2: ICD-10-CM

## 2017-06-10 DIAGNOSIS — R42: ICD-10-CM

## 2017-06-10 LAB
ANION GAP SERPL CALC-SCNC: 9 MMOL/L (ref 6–14)
BASOPHILS # BLD AUTO: 0.1 X10^3/UL (ref 0–0.2)
BASOPHILS NFR BLD: 1 % (ref 0–3)
BUN SERPL-MCNC: 17 MG/DL (ref 8–26)
CALCIUM SERPL-MCNC: 9 MG/DL (ref 8.5–10.1)
CHLORIDE SERPL-SCNC: 103 MMOL/L (ref 98–107)
CO2 SERPL-SCNC: 26 MMOL/L (ref 21–32)
CREAT SERPL-MCNC: 1.1 MG/DL (ref 0.7–1.3)
EOSINOPHIL NFR BLD: 5 % (ref 0–3)
ERYTHROCYTE [DISTWIDTH] IN BLOOD BY AUTOMATED COUNT: 14.1 % (ref 11.5–14.5)
GFR SERPLBLD BASED ON 1.73 SQ M-ARVRAT: 69.8 ML/MIN
GLUCOSE SERPL-MCNC: 195 MG/DL (ref 70–99)
HCT VFR BLD CALC: 42.3 % (ref 39–53)
HGB BLD-MCNC: 15.2 G/DL (ref 13–17.5)
LYMPHOCYTES # BLD: 1.8 X10^3/UL (ref 1–4.8)
LYMPHOCYTES NFR BLD AUTO: 30 % (ref 24–48)
MCH RBC QN AUTO: 34 PG (ref 25–35)
MCHC RBC AUTO-ENTMCNC: 36 G/DL (ref 31–37)
MCV RBC AUTO: 94 FL (ref 79–100)
MONOCYTES NFR BLD: 10 % (ref 0–9)
NEUTROPHILS NFR BLD AUTO: 54 % (ref 31–73)
PLATELET # BLD AUTO: 174 X10^3/UL (ref 140–400)
POTASSIUM SERPL-SCNC: 4.1 MMOL/L (ref 3.5–5.1)
RBC # BLD AUTO: 4.5 X10^6/UL (ref 4.3–5.7)
SODIUM SERPL-SCNC: 138 MMOL/L (ref 136–145)
WBC # BLD AUTO: 6 X10^3/UL (ref 4–11)

## 2017-06-10 PROCEDURE — 96360 HYDRATION IV INFUSION INIT: CPT

## 2017-06-10 PROCEDURE — 80048 BASIC METABOLIC PNL TOTAL CA: CPT

## 2017-06-10 PROCEDURE — 36415 COLL VENOUS BLD VENIPUNCTURE: CPT

## 2017-06-10 PROCEDURE — 99285 EMERGENCY DEPT VISIT HI MDM: CPT

## 2017-06-10 PROCEDURE — 93005 ELECTROCARDIOGRAM TRACING: CPT

## 2017-06-10 PROCEDURE — 85027 COMPLETE CBC AUTOMATED: CPT

## 2017-06-10 NOTE — PHYS DOC
Past Medical History


Past Medical History:  Anxiety, Diabetes-Type II, Fibromyalgia, GERD, High 

Cholesterol, Hypertension, Kidney Stone, Other


Additional Past Medical Histor:  CHRONIC BACK PAIN


Past Surgical History:  Cancer Surgery, Other


Additional Past Surgical Histo:  lithotripsy, back,CYSTOSCOPY W/ KIDENY STONE 

REMOVAL & STENT, BLADDER CA


Alcohol Use:  Rarely


Additional Information:  


A COUPLE OF DRINKS LAST NIGHT


Drug Use:  Marijuana





Adult General


Chief Complaint


Chief Complaint:  WEAKNESS/GENERALIZED





HPI


HPI





Patient is a 56  year old male who presents with wife for evaluation of 

generalized weakness and lightheadedness that has been over the past 1-2 weeks. 

He states he started haloperidol for "mood disorder" approximately 2 weeks ago. 

His symptoms are constant, but fluctuating in intensity throughout the day. 

Noticed these symptoms started a couple days after starting that medication. He 

denies vision changes, nausea or vomiting, fever or chills, chest pain, 

palpitations, diaphoresis, orthopnea, abdominal pain, diarrhea, dysuria, 

hematuria.





Review of Systems


Review of Systems





Constitutional: Denies fever or chills []


Eyes: Denies change in visual acuity, redness, or eye pain []


HENT: Denies nasal congestion or sore throat []


Respiratory: Denies cough or shortness of breath []


Cardiovascular: No additional information not addressed in HPI []


GI: Denies abdominal pain, nausea, vomiting, bloody stools or diarrhea []


: Denies dysuria or hematuria []


Musculoskeletal: Denies back pain or joint pain []


Integument: Denies rash or skin lesions []


Neurologic: Denies headache, focal weakness or sensory changes []


Endocrine: Denies polyuria or polydipsia []





Current Medications


Current Medications





Current Medications








 Medications


  (Trade)  Dose


 Ordered  Sig/Apple  Start Time


 Stop Time Status Last Admin


Dose Admin


 


 Sodium Chloride  1,000 ml @ 


 1,000 mls/hr  Q1H  6/10/17 22:00


 6/10/17 22:35 DC 6/10/17 21:45


1,000 MLS/HR











Allergies


Allergies





Allergies








Coded Allergies Type Severity Reaction Last Updated Verified


 


  Sulfa (Sulfonamide Antibiotics) Allergy Severe WARD ERNESTINA SYNDROME 10/26/

16 Yes











Physical Exam


Physical Exam





Constitutional: Well developed, well nourished, no acute distress, non-toxic 

appearance. []


HENT: Normocephalic, atraumatic, bilateral external ears normal, oropharynx 

moist, no oral exudates, nose normal. []


Eyes: PERRLA, EOMI. [] 


Neck: Normal range of motion, supple. [] 


Cardiovascular:Heart rate regular rhythm []


Lungs & Thorax:  Bilateral breath sounds clear to auscultation []


Abdomen: Bowel sounds normal, soft, no tenderness. [] 


Skin: Warm, dry, no erythema, no rash. [] 


Back: Normal range of motion. [] 


Extremities: No tenderness, ROM intact, no edema. [] 


Neurologic: Alert and oriented X 3, normal motor function, normal sensory 

function, no focal deficits noted, cranial nerves II through XII intact, no 

nystagmus, no pronator drift, no past pointing. []


Psychologic: Affect normal, judgement normal, mood normal. []





Current Patient Data


Vital Signs





 Vital Signs








  Date Time  Temp Pulse Resp B/P (MAP) Pulse Ox O2 Delivery O2 Flow Rate FiO2


 


6/10/17 22:13  76 25 138/75 (96) 96 Room Air  


 


6/10/17 20:45 97.6       





 97.6       








Lab Values





 Laboratory Tests








Test


  6/10/17


21:15


 


White Blood Count


  6.0 x10^3/uL


(4.0-11.0)


 


Red Blood Count


  4.50 x10^6/uL


(4.30-5.70)


 


Hemoglobin


  15.2 g/dL


(13.0-17.5)


 


Hematocrit


  42.3 %


(39.0-53.0)


 


Mean Corpuscular Volume


  94 fL ()


 


 


Mean Corpuscular Hemoglobin 34 pg (25-35)  


 


Mean Corpuscular Hemoglobin


Concent 36 g/dL


(31-37)


 


Red Cell Distribution Width


  14.1 %


(11.5-14.5)


 


Platelet Count


  174 x10^3/uL


(140-400)


 


Neutrophils (%) (Auto) 54 % (31-73)  


 


Lymphocytes (%) (Auto) 30 % (24-48)  


 


Monocytes (%) (Auto) 10 % (0-9)  H


 


Eosinophils (%) (Auto) 5 % (0-3)  H


 


Basophils (%) (Auto) 1 % (0-3)  


 


Neutrophils # (Auto)


  3.3 x10^3uL


(1.8-7.7)


 


Lymphocytes # (Auto)


  1.8 x10^3/uL


(1.0-4.8)


 


Monocytes # (Auto)


  0.6 x10^3/uL


(0.0-1.1)


 


Eosinophils # (Auto)


  0.3 x10^3/uL


(0.0-0.7)


 


Basophils # (Auto)


  0.1 x10^3/uL


(0.0-0.2)


 


Sodium Level


  138 mmol/L


(136-145)


 


Potassium Level


  4.1 mmol/L


(3.5-5.1)


 


Chloride Level


  103 mmol/L


()


 


Carbon Dioxide Level


  26 mmol/L


(21-32)


 


Anion Gap 9 (6-14)  


 


Blood Urea Nitrogen


  17 mg/dL


(8-26)


 


Creatinine


  1.1 mg/dL


(0.7-1.3)


 


Estimated GFR


(Cockcroft-Gault) 69.8  


 


 


Glucose Level


  195 mg/dL


(70-99)  H


 


Calcium Level


  9.0 mg/dL


(8.5-10.1)





 Laboratory Tests


6/10/17 21:15








 Laboratory Tests


6/10/17 21:15














EKG


EKG


EKG as interpreted by me as normal sinus rhythm, rate 69, no ST-T changes, 

normal intervals, no ectopy





Course & Med Decision Making


Course & Med Decision Making


Pertinent Labs and Imaging studies reviewed. (See chart for details)





Workup is unremarkable. He is feeling better after medications and would like 

to go home. Discussed he likely needs to decrease his haloperidol dose and 

follow-up with his primary care doctor. Return precautions given. He 

understands and agrees with plan.





Dragon Disclaimer


Dragon Disclaimer


This electronic medical record was generated, in whole or in part, using a 

voice recognition dictation system.





Departure


Departure


Impression:  


 Primary Impression:  


 Generalized weakness


Disposition:  01 HOME, SELF-CARE


Condition:  STABLE


Referrals:  


FRIDA DOE (PCP)


Patient Instructions:  Weakness, Easy-to-Read





Additional Instructions:  


Decrease your haloperidol dose by half. Follow-up with your primary care 

doctor. Return for any concerns.











JALEN CAMARA MD Grant 10, 2017 22:03

## 2017-06-11 NOTE — EKG
[facility]

               [address] [place] [address]

Test Date:    2017-06-10               Test Time:    21:21:19

Pat Name:     MURALI LEDESMA             Department:   

Patient ID:   [id number]           Room:          

Gender:       M                        Technician:   AGLO QUEEN

:          1960               Requested By: JALEN CAMARA

Order Number: [id number]            Reading MD:   Lourdes Adams

                                 Measurements

Intervals                              Axis          

Rate:         69                       P:            47

DC:           174                      QRS:          13

QRSD:         94                       T:            29

QT:           352                                    

QTc:          378                                    

                           Interpretive Statements

SINUS RHYTHM

NO SPECIFIC ECG ABNORMALITIES

RI6.01

Compared to ECG 2017 18:24:46

No significant changes



Electronically Signed On 2017 21:23:49 CDT by Lourdes Adams

## 2018-02-09 ENCOUNTER — HOSPITAL ENCOUNTER (EMERGENCY)
Dept: HOSPITAL 61 - ER | Age: 58
Discharge: HOME | End: 2018-02-09
Payer: COMMERCIAL

## 2018-02-09 DIAGNOSIS — K21.9: ICD-10-CM

## 2018-02-09 DIAGNOSIS — F12.10: ICD-10-CM

## 2018-02-09 DIAGNOSIS — E11.9: ICD-10-CM

## 2018-02-09 DIAGNOSIS — Z88.2: ICD-10-CM

## 2018-02-09 DIAGNOSIS — M79.7: ICD-10-CM

## 2018-02-09 DIAGNOSIS — F41.9: ICD-10-CM

## 2018-02-09 DIAGNOSIS — J09.X2: Primary | ICD-10-CM

## 2018-02-09 DIAGNOSIS — G89.29: ICD-10-CM

## 2018-02-09 DIAGNOSIS — I10: ICD-10-CM

## 2018-02-09 DIAGNOSIS — E78.00: ICD-10-CM

## 2018-02-09 LAB
INFLUENZA A PATIENT: POSITIVE
INFLUENZA B PATIENT: NEGATIVE
OBC FLU: (no result)

## 2018-02-09 PROCEDURE — 87804 INFLUENZA ASSAY W/OPTIC: CPT

## 2018-02-09 PROCEDURE — 99284 EMERGENCY DEPT VISIT MOD MDM: CPT

## 2018-02-15 ENCOUNTER — HOSPITAL ENCOUNTER (EMERGENCY)
Dept: HOSPITAL 61 - ER | Age: 58
Discharge: HOME | End: 2018-02-15
Payer: COMMERCIAL

## 2018-02-15 DIAGNOSIS — E87.6: ICD-10-CM

## 2018-02-15 DIAGNOSIS — Z88.2: ICD-10-CM

## 2018-02-15 DIAGNOSIS — B34.9: ICD-10-CM

## 2018-02-15 DIAGNOSIS — F12.10: ICD-10-CM

## 2018-02-15 DIAGNOSIS — R42: Primary | ICD-10-CM

## 2018-02-15 DIAGNOSIS — G89.29: ICD-10-CM

## 2018-02-15 DIAGNOSIS — E11.9: ICD-10-CM

## 2018-02-15 DIAGNOSIS — I10: ICD-10-CM

## 2018-02-15 DIAGNOSIS — K21.9: ICD-10-CM

## 2018-02-15 DIAGNOSIS — E78.00: ICD-10-CM

## 2018-02-15 DIAGNOSIS — M79.7: ICD-10-CM

## 2018-02-15 LAB
ADD MAN DIFF?: NO
ALBUMIN SERPL-MCNC: 4 G/DL (ref 3.4–5)
ALBUMIN/GLOB SERPL: 0.9 {RATIO} (ref 1–1.7)
ALP SERPL-CCNC: 150 U/L (ref 46–116)
ALT (SGPT): 19 U/L (ref 16–63)
ANION GAP SERPL CALC-SCNC: 13 MMOL/L (ref 6–14)
AST SERPL-CCNC: 15 U/L (ref 15–37)
BASO #: 0.1 X10^3/UL (ref 0–0.2)
BASO %: 1 % (ref 0–3)
BLOOD UREA NITROGEN: 19 MG/DL (ref 8–26)
BUN/CREAT SERPL: 15 (ref 6–20)
CALCIUM: 9.7 MG/DL (ref 8.5–10.1)
CHLORIDE: 99 MMOL/L (ref 98–107)
CO2 SERPL-SCNC: 25 MMOL/L (ref 21–32)
CREAT SERPL-MCNC: 1.3 MG/DL (ref 0.7–1.3)
D-DIMER: 0.34 UG/MLFEU (ref 0–0.5)
EOS #: 0.1 X10^3/UL (ref 0–0.7)
EOS %: 2 % (ref 0–3)
GFR SERPLBLD BASED ON 1.73 SQ M-ARVRAT: 57.3 ML/MIN
GLOBULIN SER-MCNC: 4.7 G/DL (ref 2.2–3.8)
GLUCOSE SERPL-MCNC: 274 MG/DL (ref 70–99)
HCG SERPL-ACNC: 6 X10^3/UL (ref 4–11)
HEMATOCRIT: 43.6 % (ref 39–53)
HEMOGLOBIN: 15.8 G/DL (ref 13–17.5)
LYMPH #: 1.7 X10^3/UL (ref 1–4.8)
LYMPH %: 29 % (ref 24–48)
MAGNESIUM: 1.8 MG/DL (ref 1.8–2.4)
MEAN CORPUSCULAR HEMOGLOBIN: 33 PG (ref 25–35)
MEAN CORPUSCULAR HGB CONC: 36 G/DL (ref 31–37)
MEAN CORPUSCULAR VOLUME: 90 FL (ref 79–100)
MONO #: 0.5 X10^3/UL (ref 0–1.1)
MONO %: 8 % (ref 0–9)
NEUT #: 3.6 X10^3UL (ref 1.8–7.7)
NEUT %: 60 % (ref 31–73)
NT-PRO BNP: 382 PG/ML (ref 0–124)
PLATELET COUNT: 273 X10^3/UL (ref 140–400)
POC GLUCOSE: 273 MG/DL (ref 70–99)
POTASSIUM SERPL-SCNC: 3.3 MMOL/L (ref 3.5–5.1)
RED BLOOD COUNT: 4.82 X10^6/UL (ref 4.3–5.7)
RED CELL DISTRIBUTION WIDTH: 12 % (ref 11.5–14.5)
SODIUM: 137 MMOL/L (ref 136–145)
TOTAL BILIRUBIN: 0.5 MG/DL (ref 0.2–1)
TOTAL PROTEIN: 8.7 G/DL (ref 6.4–8.2)
TROPONINI: < 0.017 NG/ML (ref 0–0.06)

## 2018-02-15 PROCEDURE — 71045 X-RAY EXAM CHEST 1 VIEW: CPT

## 2018-02-15 PROCEDURE — 96375 TX/PRO/DX INJ NEW DRUG ADDON: CPT

## 2018-02-15 PROCEDURE — 93005 ELECTROCARDIOGRAM TRACING: CPT

## 2018-02-15 PROCEDURE — 96361 HYDRATE IV INFUSION ADD-ON: CPT

## 2018-02-15 PROCEDURE — 82962 GLUCOSE BLOOD TEST: CPT

## 2018-02-15 PROCEDURE — 84484 ASSAY OF TROPONIN QUANT: CPT

## 2018-02-15 PROCEDURE — 83880 ASSAY OF NATRIURETIC PEPTIDE: CPT

## 2018-02-15 PROCEDURE — 85025 COMPLETE CBC W/AUTO DIFF WBC: CPT

## 2018-02-15 PROCEDURE — 94640 AIRWAY INHALATION TREATMENT: CPT

## 2018-02-15 PROCEDURE — 85379 FIBRIN DEGRADATION QUANT: CPT

## 2018-02-15 PROCEDURE — 80053 COMPREHEN METABOLIC PANEL: CPT

## 2018-02-15 PROCEDURE — 36415 COLL VENOUS BLD VENIPUNCTURE: CPT

## 2018-02-15 PROCEDURE — 96374 THER/PROPH/DIAG INJ IV PUSH: CPT

## 2018-02-15 PROCEDURE — 83735 ASSAY OF MAGNESIUM: CPT

## 2018-02-15 PROCEDURE — 99285 EMERGENCY DEPT VISIT HI MDM: CPT

## 2018-02-15 RX ADMIN — OXYCODONE HYDROCHLORIDE AND ACETAMINOPHEN 1 TAB: 5; 325 TABLET ORAL at 14:47

## 2018-02-15 RX ADMIN — MECLIZINE 1 MG: 12.5 TABLET ORAL at 14:46

## 2018-02-15 RX ADMIN — BACITRACIN 1 MLS/HR: 5000 INJECTION, POWDER, FOR SOLUTION INTRAMUSCULAR at 14:49

## 2018-02-15 RX ADMIN — ONDANSETRON 1 MG: 2 INJECTION INTRAMUSCULAR; INTRAVENOUS at 14:48

## 2018-02-15 RX ADMIN — KETOROLAC TROMETHAMINE 1 MG: 15 INJECTION, SOLUTION INTRAMUSCULAR; INTRAVENOUS at 14:49

## 2018-02-15 RX ADMIN — IPRATROPIUM BROMIDE AND ALBUTEROL SULFATE 1 ML: .5; 3 SOLUTION RESPIRATORY (INHALATION) at 14:47

## 2018-02-15 RX ADMIN — POTASSIUM CHLORIDE 1 MEQ: 1500 TABLET, EXTENDED RELEASE ORAL at 15:33

## 2018-02-15 RX ADMIN — METHYLPREDNISOLONE SODIUM SUCCINATE 1 MG: 125 INJECTION, POWDER, FOR SOLUTION INTRAMUSCULAR; INTRAVENOUS at 14:48

## 2018-05-01 ENCOUNTER — HOSPITAL ENCOUNTER (EMERGENCY)
Dept: HOSPITAL 61 - ER | Age: 58
Discharge: HOME | End: 2018-05-01
Payer: COMMERCIAL

## 2018-05-01 DIAGNOSIS — Z79.891: ICD-10-CM

## 2018-05-01 DIAGNOSIS — Z88.2: ICD-10-CM

## 2018-05-01 DIAGNOSIS — E11.9: ICD-10-CM

## 2018-05-01 DIAGNOSIS — K21.9: ICD-10-CM

## 2018-05-01 DIAGNOSIS — M25.561: Primary | ICD-10-CM

## 2018-05-01 DIAGNOSIS — I10: ICD-10-CM

## 2018-05-01 DIAGNOSIS — M54.9: ICD-10-CM

## 2018-05-01 DIAGNOSIS — F12.10: ICD-10-CM

## 2018-05-01 DIAGNOSIS — E78.00: ICD-10-CM

## 2018-05-01 DIAGNOSIS — G89.29: ICD-10-CM

## 2018-05-01 DIAGNOSIS — Z79.899: ICD-10-CM

## 2018-05-01 LAB
ADD MAN DIFF?: NO
ALBUMIN SERPL-MCNC: 3.6 G/DL (ref 3.4–5)
ALBUMIN/GLOB SERPL: 0.9 {RATIO} (ref 1–1.7)
ALP SERPL-CCNC: 207 U/L (ref 46–116)
ALT (SGPT): 18 U/L (ref 16–63)
ANION GAP SERPL CALC-SCNC: 13 MMOL/L (ref 6–14)
AST SERPL-CCNC: 13 U/L (ref 15–37)
BASO #: 0.1 X10^3/UL (ref 0–0.2)
BASO %: 2 % (ref 0–3)
BLOOD UREA NITROGEN: 15 MG/DL (ref 8–26)
BUN/CREAT SERPL: 12 (ref 6–20)
CALCIUM: 9 MG/DL (ref 8.5–10.1)
CHLORIDE: 101 MMOL/L (ref 98–107)
CO2 SERPL-SCNC: 22 MMOL/L (ref 21–32)
CREAT SERPL-MCNC: 1.3 MG/DL (ref 0.7–1.3)
CRP SERPL-MCNC: 2 MG/L (ref 0–3.3)
EOS #: 0.3 X10^3/UL (ref 0–0.7)
EOS %: 4 % (ref 0–3)
GFR SERPLBLD BASED ON 1.73 SQ M-ARVRAT: 57.3 ML/MIN
GLOBULIN SER-MCNC: 4.1 G/DL (ref 2.2–3.8)
GLUCOSE SERPL-MCNC: 159 MG/DL (ref 70–99)
HCG SERPL-ACNC: 7.7 X10^3/UL (ref 4–11)
HEMATOCRIT: 42.2 % (ref 39–53)
HEMOGLOBIN: 15 G/DL (ref 13–17.5)
LYMPH #: 2.1 X10^3/UL (ref 1–4.8)
LYMPH %: 28 % (ref 24–48)
MEAN CORPUSCULAR HEMOGLOBIN: 33 PG (ref 25–35)
MEAN CORPUSCULAR HGB CONC: 36 G/DL (ref 31–37)
MEAN CORPUSCULAR VOLUME: 92 FL (ref 79–100)
MONO #: 0.6 X10^3/UL (ref 0–1.1)
MONO %: 8 % (ref 0–9)
NEUT #: 4.5 X10^3UL (ref 1.8–7.7)
NEUT %: 58 % (ref 31–73)
PLATELET COUNT: 250 X10^3/UL (ref 140–400)
POTASSIUM SERPL-SCNC: 4.1 MMOL/L (ref 3.5–5.1)
RED BLOOD COUNT: 4.57 X10^6/UL (ref 4.3–5.7)
RED CELL DISTRIBUTION WIDTH: 13.2 % (ref 11.5–14.5)
SEDIMENTATION RATE: 10 (ref 0–15)
SODIUM: 136 MMOL/L (ref 136–145)
TOTAL BILIRUBIN: 0.3 MG/DL (ref 0.2–1)
TOTAL PROTEIN: 7.7 G/DL (ref 6.4–8.2)
URIC ACID: 6.2 MG/DL (ref 3.5–7.2)

## 2018-05-01 PROCEDURE — 99285 EMERGENCY DEPT VISIT HI MDM: CPT

## 2018-05-01 PROCEDURE — 80053 COMPREHEN METABOLIC PANEL: CPT

## 2018-05-01 PROCEDURE — 20610 DRAIN/INJ JOINT/BURSA W/O US: CPT

## 2018-05-01 PROCEDURE — 73562 X-RAY EXAM OF KNEE 3: CPT

## 2018-05-01 PROCEDURE — 96374 THER/PROPH/DIAG INJ IV PUSH: CPT

## 2018-05-01 PROCEDURE — 85651 RBC SED RATE NONAUTOMATED: CPT

## 2018-05-01 PROCEDURE — 96375 TX/PRO/DX INJ NEW DRUG ADDON: CPT

## 2018-05-01 PROCEDURE — 86140 C-REACTIVE PROTEIN: CPT

## 2018-05-01 PROCEDURE — 85025 COMPLETE CBC W/AUTO DIFF WBC: CPT

## 2018-05-01 PROCEDURE — 36415 COLL VENOUS BLD VENIPUNCTURE: CPT

## 2018-05-01 PROCEDURE — 84550 ASSAY OF BLOOD/URIC ACID: CPT

## 2018-05-01 RX ADMIN — HYDROCODONE BITARTRATE AND ACETAMINOPHEN 1 TAB: 5; 325 TABLET ORAL at 06:24

## 2018-05-01 RX ADMIN — LIDOCAINE HYDROCHLORIDE 1 ML: 20 INJECTION, SOLUTION INFILTRATION; PERINEURAL at 08:45

## 2018-05-01 RX ADMIN — LIDOCAINE HYDROCHLORIDE 1 ML: 10 INJECTION, SOLUTION INFILTRATION; PERINEURAL at 08:45

## 2018-05-01 RX ADMIN — KETOROLAC TROMETHAMINE 1 MG: 30 INJECTION, SOLUTION INTRAMUSCULAR at 05:45

## 2018-05-17 ENCOUNTER — HOSPITAL ENCOUNTER (EMERGENCY)
Dept: HOSPITAL 61 - ER | Age: 58
Discharge: HOME | End: 2018-05-17
Payer: COMMERCIAL

## 2018-05-17 VITALS — DIASTOLIC BLOOD PRESSURE: 93 MMHG | SYSTOLIC BLOOD PRESSURE: 163 MMHG

## 2018-05-17 DIAGNOSIS — M79.7: ICD-10-CM

## 2018-05-17 DIAGNOSIS — N20.0: Primary | ICD-10-CM

## 2018-05-17 DIAGNOSIS — F41.9: ICD-10-CM

## 2018-05-17 DIAGNOSIS — G89.29: ICD-10-CM

## 2018-05-17 DIAGNOSIS — F12.10: ICD-10-CM

## 2018-05-17 DIAGNOSIS — Z87.442: ICD-10-CM

## 2018-05-17 DIAGNOSIS — I10: ICD-10-CM

## 2018-05-17 DIAGNOSIS — F32.9: ICD-10-CM

## 2018-05-17 DIAGNOSIS — E78.00: ICD-10-CM

## 2018-05-17 DIAGNOSIS — Z79.4: ICD-10-CM

## 2018-05-17 DIAGNOSIS — E11.65: ICD-10-CM

## 2018-05-17 DIAGNOSIS — Z88.2: ICD-10-CM

## 2018-05-17 DIAGNOSIS — K21.9: ICD-10-CM

## 2018-05-17 LAB
ADD MAN DIFF?: NO
ALBUMIN SERPL-MCNC: 3.8 G/DL (ref 3.4–5)
ALBUMIN/GLOB SERPL: 1 {RATIO} (ref 1–1.7)
ALP SERPL-CCNC: 227 U/L (ref 46–116)
ALT (SGPT): 21 U/L (ref 16–63)
ANION GAP SERPL CALC-SCNC: 9 MMOL/L (ref 6–14)
AST SERPL-CCNC: 18 U/L (ref 15–37)
BACTERIA,URINE: 0 /HPF
BASO #: 0.1 X10^3/UL (ref 0–0.2)
BASO %: 1 % (ref 0–3)
BILIRUBIN,URINE: NEGATIVE
BLOOD UREA NITROGEN: 18 MG/DL (ref 8–26)
BUN/CREAT SERPL: 12 (ref 6–20)
CALCIUM: 9 MG/DL (ref 8.5–10.1)
CHLORIDE: 100 MMOL/L (ref 98–107)
CLARITY,URINE: CLEAR
CO2 SERPL-SCNC: 28 MMOL/L (ref 21–32)
COLOR,URINE: YELLOW
CREAT SERPL-MCNC: 1.5 MG/DL (ref 0.7–1.3)
EOS #: 0.3 X10^3/UL (ref 0–0.7)
EOS %: 4 % (ref 0–3)
GFR SERPLBLD BASED ON 1.73 SQ M-ARVRAT: 48.6 ML/MIN
GLOBULIN SER-MCNC: 4 G/DL (ref 2.2–3.8)
GLUCOSE SERPL-MCNC: 386 MG/DL (ref 70–99)
GLUCOSE,URINE: >=1000 MG/DL
HCG SERPL-ACNC: 7 X10^3/UL (ref 4–11)
HEMATOCRIT: 39.9 % (ref 39–53)
HEMOGLOBIN: 13.9 G/DL (ref 13–17.5)
HYALINE CASTS, URINE: (no result) /HPF
LIPASE: 174 U/L (ref 73–393)
LYMPH #: 2.1 X10^3/UL (ref 1–4.8)
LYMPH %: 30 % (ref 24–48)
MEAN CORPUSCULAR HEMOGLOBIN: 32 PG (ref 25–35)
MEAN CORPUSCULAR HGB CONC: 35 G/DL (ref 31–37)
MEAN CORPUSCULAR VOLUME: 93 FL (ref 79–100)
MONO #: 0.6 X10^3/UL (ref 0–1.1)
MONO %: 8 % (ref 0–9)
NEUT #: 4 X10^3UL (ref 1.8–7.7)
NEUT %: 57 % (ref 31–73)
NITRITE,URINE: NEGATIVE
PH,URINE: 5.5
PLATELET COUNT: 259 X10^3/UL (ref 140–400)
POTASSIUM SERPL-SCNC: 3.3 MMOL/L (ref 3.5–5.1)
PROTEIN,URINE: NEGATIVE MG/DL
RBC,URINE: (no result) /HPF (ref 0–2)
RED BLOOD COUNT: 4.31 X10^6/UL (ref 4.3–5.7)
RED CELL DISTRIBUTION WIDTH: 12.9 % (ref 11.5–14.5)
SODIUM: 137 MMOL/L (ref 136–145)
SPECIFIC GRAVITY,URINE: >=1.03
TOTAL BILIRUBIN: 0.3 MG/DL (ref 0.2–1)
TOTAL PROTEIN: 7.8 G/DL (ref 6.4–8.2)
UROBILINOGEN,URINE: 0.2 MG/DL
WBC,URINE: (no result) /HPF (ref 0–4)

## 2018-05-17 PROCEDURE — 83690 ASSAY OF LIPASE: CPT

## 2018-05-17 PROCEDURE — 80053 COMPREHEN METABOLIC PANEL: CPT

## 2018-05-17 PROCEDURE — 85025 COMPLETE CBC W/AUTO DIFF WBC: CPT

## 2018-05-17 PROCEDURE — 74176 CT ABD & PELVIS W/O CONTRAST: CPT

## 2018-05-17 PROCEDURE — 36415 COLL VENOUS BLD VENIPUNCTURE: CPT

## 2018-05-17 PROCEDURE — 96374 THER/PROPH/DIAG INJ IV PUSH: CPT

## 2018-05-17 PROCEDURE — 99285 EMERGENCY DEPT VISIT HI MDM: CPT

## 2018-05-17 PROCEDURE — 96375 TX/PRO/DX INJ NEW DRUG ADDON: CPT

## 2018-05-17 PROCEDURE — 81001 URINALYSIS AUTO W/SCOPE: CPT

## 2018-05-17 PROCEDURE — 96376 TX/PRO/DX INJ SAME DRUG ADON: CPT

## 2018-05-17 RX ADMIN — ONDANSETRON 1 MG: 2 INJECTION INTRAMUSCULAR; INTRAVENOUS at 23:15

## 2018-05-17 RX ADMIN — KETOROLAC TROMETHAMINE 1 MG: 30 INJECTION, SOLUTION INTRAMUSCULAR at 22:00

## 2018-05-17 RX ADMIN — TAMSULOSIN HYDROCHLORIDE 1 MG: 0.4 CAPSULE ORAL at 22:00

## 2018-05-17 RX ADMIN — MORPHINE SULFATE 1 MG: 10 INJECTION INTRAMUSCULAR; INTRAVENOUS; SUBCUTANEOUS at 23:15

## 2018-05-17 RX ADMIN — ONDANSETRON 1 MG: 2 INJECTION INTRAMUSCULAR; INTRAVENOUS at 22:00

## 2018-05-17 RX ADMIN — MORPHINE SULFATE 1 MG: 10 INJECTION INTRAMUSCULAR; INTRAVENOUS; SUBCUTANEOUS at 22:00

## 2021-06-29 NOTE — PDOC2
NEUROLOGY CONSULT


Date of Admission


Date of Admission


DATE: 4/9/17 


TIME: 16:25





Reason for Consult


Reason for Consult:


Dysarthria, dizziness





Referring Physician


Referring Physician:


Dr. Oro





Identification/Chief Complaint


Chief Complaint


Dr. Khan


Problems:  





Source


Source:  Caregiver, Chart review, Patient





History of Present Illness


History of Present Illness


The patient is a 56-year-old right-handed male admitted with 3 or 4 days of 

increasing confusion, dysarthria, and dizziness. He recently was started on 

buspirone and Valium. He does have chronic dizziness and has seen ENT, without 

abnormalities found. There is a plan to send him to audiology, but he says that 

his insurance will not pay for that. The patient describes dizziness but not 

true vertigo. There is no diplopia or dysphagia. He believes he is doing better 

today. Again, symptoms started at the onset of the buspirone and Valium, and 

the patient recently stopped taking Wellbutrin because of suicidal thoughts. 

Dr. Khan, his primary physician, is managing the psychiatric problems.





Past Medical History


Cardiovascular:  HTN, Hyperlipidemia


Pulmonary:  Pneumonia, Other (sleep apnea)


GI:  GERD, Other (diarrhea)


Heme/Onc:  Anemia NOS


Psych:  Anxiety, Depression


ENT:  Other (hearing problem, dizziness)


Renal/:  Other (impotence, renal stones status-post lithotripsy)


Endocrine:  Diabetes





Past Surgical History


Past Surgical History:  No pertinent history





Family History


Family History:  Cancer





Social History


Social History


, , no tobacco or alcohol, occasional marijuana





Current Medications


Current Medications





 Current Medications


Sodium Chloride (Iv Sodium Chloride 0.9% 1000ml Bag) 1,000 ml @  1,000 mls/hr 

1X  ONCE IV  Last administered on 4/8/17at 18:40;  Start 4/8/17 at 18:30;  Stop 

4/8/17 at 19:29;  Status DC


Ondansetron HCl (Zofran) 4 mg PRN Q8HRS  PRN IV NAUSEA/VOMITING;  Start 4/8/17 

at 18:45;  Stop 4/8/17 at 19:09;  Status DC


Morphine Sulfate 2 mg PRN Q2HR  PRN IV PAIN;  Start 4/8/17 at 18:45;  Stop 4/9/ 17 at 14:30;  Status DC


Acetaminophen (Tylenol) 650 mg PRN Q6HRS  PRN PO FEVER Last administered on 4/9/ 17at 10:52;  Start 4/8/17 at 19:15;  Stop 4/9/17 at 14:30;  Status DC


Ondansetron HCl (Zofran) 4 mg PRN Q6HRS  PRN IV NAUSEA/VOMITING;  Start 4/8/17 

at 19:15;  Stop 4/9/17 at 14:30;  Status DC


Insulin Aspart (Novolog) 0-9 UNITS QIDACHS SQ ;  Start 4/8/17 at 21:00;  Stop 4/ 9/17 at 14:30;  Status DC


Dextrose (Dextrose 50%-Water Syringe) 12.5 gm PRN Q15MIN  PRN IV SEE COMMENTS;  

Start 4/8/17 at 19:15;  Stop 4/9/17 at 14:30;  Status DC


Hydralazine HCl (Apresoline) 10 mg PRN Q4HRS  PRN IVP ELEVATED BP, SEE COMMENTS

;  Start 4/8/17 at 19:15;  Stop 4/9/17 at 14:30;  Status DC


Gadobutrol (Gadavist) 9 mmol 1X  ONCE IV  Last administered on 4/8/17at 20:11;  

Start 4/8/17 at 20:30;  Stop 4/8/17 at 20:31;  Status DC


Atorvastatin Calcium (Lipitor) 40 mg HS PO ;  Start 4/9/17 at 21:00;  Stop 4/9/ 17 at 21:00;  Status DC


Acetaminophen/ Hydrocodone Bitart (Lortab 5/325) 1 tab PRN Q6HRS  PRN PO PAIN;  

Start 4/9/17 at 11:45;  Stop 4/9/17 at 14:30;  Status DC


Lisinopril (Prinivil) 10 mg DAILY PO  Last administered on 4/9/17at 12:37;  

Start 4/9/17 at 13:00;  Stop 4/9/17 at 14:30;  Status DC


Naproxen (Naprosyn) 250 mg PRN BID  PRN PO PAIN;  Start 4/9/17 at 11:45;  Stop 4 /9/17 at 13:01;  Status DC


Sennosides (Senna) 8.6 mg PRN BID  PRN PO CONSTIPATION;  Start 4/9/17 at 11:45;

  Stop 4/9/17 at 14:30;  Status DC


Tamsulosin HCl (Flomax) 0.4 mg DAILY PO  Last administered on 4/9/17at 12:37;  

Start 4/9/17 at 13:00;  Stop 4/9/17 at 14:30;  Status DC


Pantoprazole Sodium (Protonix) 40 mg DAILYAC PO  Last administered on 4/9/17at 

12:38;  Start 4/9/17 at 13:00;  Stop 4/9/17 at 14:30;  Status DC


Potassium Chloride (Klor-Con) 20 meq DAILYWBKFT PO  Last administered on 4/9/ 17at 12:38;  Start 4/9/17 at 13:00;  Stop 4/9/17 at 14:30;  Status DC


Sertraline HCl (Zoloft) 100 mg DAILY PO ;  Start 4/9/17 at 13:00;  Stop 4/9/17 

at 14:30;  Status DC


Linagliptin (Tradjenta) 5 mg DAILY PO  Last administered on 4/9/17at 12:37;  

Start 4/9/17 at 13:00;  Stop 4/9/17 at 14:30;  Status DC


Insulin Detemir 10 units 10 units QHS SQ ;  Start 4/9/17 at 21:00;  Stop 4/9/17 

at 21:00;  Status DC


Sodium Chloride (Iv Sodium Chloride 0.9% 1000ml Bag) 1,000 ml @  100 mls/hr 1X  

ONCE IV ;  Start 4/9/17 at 13:00;  Stop 4/9/17 at 14:30;  Status DC





Active Scripts


Active


Lortab 5-325 mg Tablet (Hydrocodone/Acetaminophen) 1 Each Tablet 1 Tab PO PRN 

Q6HRS PRN


Flomax (Tamsulosin Hcl) 0.4 Mg Cap.er.24h 0.4 Mg PO DAILY


Zofran (Ondansetron Hcl) 4 Mg Tablet 1 Tab PO Q8HRS PRN


Percocet 7.5-325 Mg Tablet (Oxycodone/Acetaminophen) 1 Each Tablet 1 Tab PO PRN 

Q6HRS PRN


Reported


Potassium Chloride 10 Meq Tablet.er 20 Meq PO DAILY


Omeprazole 40 Mg Capsule.dr 1 Cap PO DAILY


Hyoscyamine Sulfate 0.125 Mg Tab.subl 1 SL PRN Q4HRS PRN


Rosuvastatin Calcium 20 Mg Tablet 1 PO QHS


Gabapentin 100 Mg Capsule 1 PO QHS


Metformin Hcl Er (Metformin Hcl) 1,000 Mg Tab.er.24   


Meclizine Hcl 25 Mg Tablet   


Lisinopril 10 Mg Tablet 1 PO DAILY


Senna (Sennosides) 8.6 Mg Tablet   


Toujeo Solostar (Insulin Glargine,Hum.rec.anlog) 300 Unit/1 Ml Insuln.pen 20 

Unit SQ DAILY


Sertraline Hcl 100 Mg Tablet 100 Mg PO DAILY


Januvia (Sitagliptin Phosphate) 100 Mg Tablet 100 Mg PO DAILY


Atorvastatin Calcium 40 Mg Tablet 40 Mg PO HS





Allergies


Allergies:  


Coded Allergies:  


     Sulfa (Sulfonamide Antibiotics) (Verified  Allergy, Severe, WARD 

ERNESTINA SYNDROME, 10/26/16)





ROS


Review of System


Patient denies fevers, chills, weight loss, dyspnea, angina, abdominal pain, 

change in bowels, or dysuria. 14 point review of systems is negative.





Physical Exam


Physical Examination


PHYSICAL EXAMINATION:  


Vital signs: see above.  


General appearance is normal and in no acute distress.  


HEENT:  Normocephalic and nontraumatic.  Eyes, nose, ears, and throat are 

unremarkable.  


Neck is supple. No lymphadenopathy. No bruits are heard over the carotid 

artery.  No crepitus. 


NEUROLOGICAL EXAMINATION:  


Mental Status Examination:  Alert. Oriented to time, place, and person. Answers 

questions and follows commends. Pupils are equal round and reactive to light 

and accommodation.  Funduscopic exam:  No papilledema.  Extraocular movements 

are intact.   Visual field exam shows no defect on the direct confrontation.  

No motor or sensory deficits on the facial exam.  Uvula in the midline and the 

soft palate elevated symmetrically.  No deviation of the tongue to any 

direction.  Gross hearing is normal. No nystagmus elicited. Vestibular ocular 

reflex is normal  Shoulder shrug normal.  Muscle tone is normal.  Muscle 

strength is 5.  Deep tendon reflexes are 2+ all around.  Plantar reflex is with 

flexion response bilaterally.  Finger-to-nose test performance is accurate.  

Tandem walk test is accurate.  Alternative movements are accurate.  Romberg 

test is negative. Gait is normal.  Sensory exam shows no deficits. No 

cerebellar signs are elicited.





Vitals


VITALS





 Vital Signs








  Date Time  Temp Pulse Resp B/P Pulse Ox O2 Delivery O2 Flow Rate FiO2


 


4/9/17 12:37  93  134/86    


 


4/9/17 10:37 97.5  20  98 Room Air  





 97.5       











Labs


Labs





Laboratory Tests








Test


  4/8/17


18:25 4/8/17


21:10 4/8/17


21:15 4/9/17


04:34


 


White Blood Count


  7.2x10^3/uL


(4.0-11.0) 


  


  


 


 


Red Blood Count


  4.20x10^6/uL


(4.30-5.70) 


  


  


 


 


Hemoglobin


  13.6g/dL


(13.0-17.5) 


  


  


 


 


Hematocrit


  39.6%


(39.0-53.0) 


  


  


 


 


Mean Corpuscular Volume 94fL ()    


 


Mean Corpuscular Hemoglobin 32pg (25-35)    


 


Mean Corpuscular Hemoglobin


Concent 34g/dL (31-37) 


  


  


  


 


 


Red Cell Distribution Width


  13.3%


(11.5-14.5) 


  


  


 


 


Platelet Count


  227x10^3/uL


(140-400) 


  


  


 


 


Neutrophils (%) (Auto) 66% (31-73)    


 


Lymphocytes (%) (Auto) 23% (24-48)    


 


Monocytes (%) (Auto) 8% (0-9)    


 


Eosinophils (%) (Auto) 4% (0-3)    


 


Basophils (%) (Auto) 0% (0-3)    


 


Neutrophils # (Auto)


  4.7x10^3uL


(1.8-7.7) 


  


  


 


 


Lymphocytes # (Auto)


  1.6x10^3/uL


(1.0-4.8) 


  


  


 


 


Monocytes # (Auto)


  0.6x10^3/uL


(0.0-1.1) 


  


  


 


 


Eosinophils # (Auto)


  0.3x10^3/uL


(0.0-0.7) 


  


  


 


 


Basophils # (Auto)


  0.0x10^3/uL


(0.0-0.2) 


  


  


 


 


Sodium Level


  142mmol/L


(136-145) 


  


  143mmol/L


(136-145)


 


Potassium Level


  4.1mmol/L


(3.5-5.1) 


  


  4.1mmol/L


(3.5-5.1)


 


Chloride Level


  105mmol/L


() 


  


  107mmol/L


()


 


Carbon Dioxide Level


  25mmol/L


(21-32) 


  


  26mmol/L


(21-32)


 


Anion Gap 12 (6-14)    10 (6-14) 


 


Blood Urea Nitrogen 18mg/dL (8-26)    17mg/dL (8-26) 


 


Creatinine


  1.4mg/dL


(0.7-1.3) 


  


  1.4mg/dL


(0.7-1.3)


 


Estimated GFR


(Cockcroft-Gault) 52.8 


  


  


  52.8 


 


 


Glucose Level


  178mg/dL


(70-99) 


  


  156mg/dL


(70-99)


 


Serum Osmolality


  295mOsm/Kg


(279-304) 


  


  


 


 


Lactic Acid Level


  2.2mmol/L


(0.4-2.0) 


  


  


 


 


Calcium Level


  9.5mg/dL


(8.5-10.1) 


  


  8.7mg/dL


(8.5-10.1)


 


Total Bilirubin


  0.5mg/dL


(0.2-1.0) 


  


  


 


 


Direct Bilirubin


  0.1mg/dL


(0.0-0.2) 


  


  


 


 


Aspartate Amino Transf


(AST/SGOT) 21U/L (15-37) 


  


  


  


 


 


Alanine Aminotransferase


(ALT/SGPT) 29U/L (16-63) 


  


  


  


 


 


Alkaline Phosphatase


  177U/L


() 


  


  


 


 


Troponin I Quantitative


  < 0.017ng/mL


(0.000-0.055) 


  


  


 


 


NT-Pro-B-Type Natriuretic


Peptide 100pg/mL


(0-124) 


  


  


 


 


Total Protein


  7.5g/dL


(6.4-8.2) 


  


  


 


 


Albumin


  3.6g/dL


(3.4-5.0) 


  


  


 


 


Lipase 93U/L ()    


 


Ethyl Alcohol Level


  < 10mg/dL


(0-10) 


  


  


 


 


Urine Collection Type  Unknown   


 


Urine Color  Yellow   


 


Urine Clarity  Clear   


 


Urine pH  5.5   


 


Urine Specific Gravity  1.020   


 


Urine Protein


  


  Negativemg/dL


(NEG-TRACE) 


  


 


 


Urine Glucose (UA)


  


  Negativemg/dL


(NEG) 


  


 


 


Urine Ketones (Stick)


  


  Negativemg/dL


(NEG) 


  


 


 


Urine Blood  Large (NEG)   


 


Urine Nitrite  Negative (NEG)   


 


Urine Bilirubin  Negative (NEG)   


 


Urine Urobilinogen Dipstick


  


  0.2mg/dL (0.2


mg/dL) 


  


 


 


Urine Leukocyte Esterase  Negative (NEG)   


 


Urine RBC


  


  20-40/HPF


(0-2) 


  


 


 


Urine WBC  1-4/HPF (0-4)   


 


Urine Squamous Epithelial


Cells 


  Few/LPF 


  


  


 


 


Urine Bacteria


  


  Few/HPF


(0-FEW) 


  


 


 


Urine Hyaline Casts  Many/HPF   


 


Urine Mucus  Mod/LPF   


 


Urine Opiates Screen  Neg (NEG)   


 


Urine Methadone Screen  Neg (NEG)   


 


Urine Barbiturates  Neg (NEG)   


 


Urine Phencyclidine Screen  Neg (NEG)   


 


Urine


Amphetamine/Methamphetamine 


  Neg (NEG) 


  


  


 


 


Urine Benzodiazepines Screen  Pos (NEG)   


 


Urine Cocaine Screen  Neg (NEG)   


 


Urine Cannabinoids Screen  Pos (NEG)   


 


Urine Ethyl Alcohol  Neg (NEG)   


 


Glucose (Fingerstick)


  


  


  104mg/dL


(70-99) 


 














Test


  4/9/17


04:39 4/9/17


07:42 4/9/17


11:47 


 


 


White Blood Count


  6.2x10^3/uL


(4.0-11.0) 


  


  


 


 


Red Blood Count


  3.92x10^6/uL


(4.30-5.70) 


  


  


 


 


Hemoglobin


  12.6g/dL


(13.0-17.5) 


  


  


 


 


Hematocrit


  37.1%


(39.0-53.0) 


  


  


 


 


Mean Corpuscular Volume 95fL ()    


 


Mean Corpuscular Hemoglobin 32pg (25-35)    


 


Mean Corpuscular Hemoglobin


Concent 34g/dL (31-37) 


  


  


  


 


 


Red Cell Distribution Width


  13.7%


(11.5-14.5) 


  


  


 


 


Platelet Count


  207x10^3/uL


(140-400) 


  


  


 


 


Neutrophils (%) (Auto) 58% (31-73)    


 


Lymphocytes (%) (Auto) 28% (24-48)    


 


Monocytes (%) (Auto) 8% (0-9)    


 


Eosinophils (%) (Auto) 5% (0-3)    


 


Basophils (%) (Auto) 2% (0-3)    


 


Neutrophils # (Auto)


  3.6x10^3uL


(1.8-7.7) 


  


  


 


 


Lymphocytes # (Auto)


  1.8x10^3/uL


(1.0-4.8) 


  


  


 


 


Monocytes # (Auto)


  0.5x10^3/uL


(0.0-1.1) 


  


  


 


 


Eosinophils # (Auto)


  0.3x10^3/uL


(0.0-0.7) 


  


  


 


 


Basophils # (Auto)


  0.1x10^3/uL


(0.0-0.2) 


  


  


 


 


Glucose (Fingerstick)


  


  168mg/dL


(70-99) 187mg/dL


(70-99) 


 








Laboratory Tests








Test


  4/8/17


18:25 4/8/17


21:10 4/8/17


21:15 4/9/17


04:34


 


White Blood Count


  7.2x10^3/uL


(4.0-11.0) 


  


  


 


 


Red Blood Count


  4.20x10^6/uL


(4.30-5.70) 


  


  


 


 


Hemoglobin


  13.6g/dL


(13.0-17.5) 


  


  


 


 


Hematocrit


  39.6%


(39.0-53.0) 


  


  


 


 


Mean Corpuscular Volume 94fL ()    


 


Mean Corpuscular Hemoglobin 32pg (25-35)    


 


Mean Corpuscular Hemoglobin


Concent 34g/dL (31-37) 


  


  


  


 


 


Red Cell Distribution Width


  13.3%


(11.5-14.5) 


  


  


 


 


Platelet Count


  227x10^3/uL


(140-400) 


  


  


 


 


Neutrophils (%) (Auto) 66% (31-73)    


 


Lymphocytes (%) (Auto) 23% (24-48)    


 


Monocytes (%) (Auto) 8% (0-9)    


 


Eosinophils (%) (Auto) 4% (0-3)    


 


Basophils (%) (Auto) 0% (0-3)    


 


Neutrophils # (Auto)


  4.7x10^3uL


(1.8-7.7) 


  


  


 


 


Lymphocytes # (Auto)


  1.6x10^3/uL


(1.0-4.8) 


  


  


 


 


Monocytes # (Auto)


  0.6x10^3/uL


(0.0-1.1) 


  


  


 


 


Eosinophils # (Auto)


  0.3x10^3/uL


(0.0-0.7) 


  


  


 


 


Basophils # (Auto)


  0.0x10^3/uL


(0.0-0.2) 


  


  


 


 


Sodium Level


  142mmol/L


(136-145) 


  


  143mmol/L


(136-145)


 


Potassium Level


  4.1mmol/L


(3.5-5.1) 


  


  4.1mmol/L


(3.5-5.1)


 


Chloride Level


  105mmol/L


() 


  


  107mmol/L


()


 


Carbon Dioxide Level


  25mmol/L


(21-32) 


  


  26mmol/L


(21-32)


 


Anion Gap 12 (6-14)    10 (6-14) 


 


Blood Urea Nitrogen 18mg/dL (8-26)    17mg/dL (8-26) 


 


Creatinine


  1.4mg/dL


(0.7-1.3) 


  


  1.4mg/dL


(0.7-1.3)


 


Estimated GFR


(Cockcroft-Gault) 52.8 


  


  


  52.8 


 


 


Glucose Level


  178mg/dL


(70-99) 


  


  156mg/dL


(70-99)


 


Serum Osmolality


  295mOsm/Kg


(279-304) 


  


  


 


 


Lactic Acid Level


  2.2mmol/L


(0.4-2.0) 


  


  


 


 


Calcium Level


  9.5mg/dL


(8.5-10.1) 


  


  8.7mg/dL


(8.5-10.1)


 


Total Bilirubin


  0.5mg/dL


(0.2-1.0) 


  


  


 


 


Direct Bilirubin


  0.1mg/dL


(0.0-0.2) 


  


  


 


 


Aspartate Amino Transf


(AST/SGOT) 21U/L (15-37) 


  


  


  


 


 


Alanine Aminotransferase


(ALT/SGPT) 29U/L (16-63) 


  


  


  


 


 


Alkaline Phosphatase


  177U/L


() 


  


  


 


 


Troponin I Quantitative


  < 0.017ng/mL


(0.000-0.055) 


  


  


 


 


NT-Pro-B-Type Natriuretic


Peptide 100pg/mL


(0-124) 


  


  


 


 


Total Protein


  7.5g/dL


(6.4-8.2) 


  


  


 


 


Albumin


  3.6g/dL


(3.4-5.0) 


  


  


 


 


Lipase 93U/L ()    


 


Ethyl Alcohol Level


  < 10mg/dL


(0-10) 


  


  


 


 


Urine Collection Type  Unknown   


 


Urine Color  Yellow   


 


Urine Clarity  Clear   


 


Urine pH  5.5   


 


Urine Specific Gravity  1.020   


 


Urine Protein


  


  Negativemg/dL


(NEG-TRACE) 


  


 


 


Urine Glucose (UA)


  


  Negativemg/dL


(NEG) 


  


 


 


Urine Ketones (Stick)


  


  Negativemg/dL


(NEG) 


  


 


 


Urine Blood  Large (NEG)   


 


Urine Nitrite  Negative (NEG)   


 


Urine Bilirubin  Negative (NEG)   


 


Urine Urobilinogen Dipstick


  


  0.2mg/dL (0.2


mg/dL) 


  


 


 


Urine Leukocyte Esterase  Negative (NEG)   


 


Urine RBC


  


  20-40/HPF


(0-2) 


  


 


 


Urine WBC  1-4/HPF (0-4)   


 


Urine Squamous Epithelial


Cells 


  Few/LPF 


  


  


 


 


Urine Bacteria


  


  Few/HPF


(0-FEW) 


  


 


 


Urine Hyaline Casts  Many/HPF   


 


Urine Mucus  Mod/LPF   


 


Urine Opiates Screen  Neg (NEG)   


 


Urine Methadone Screen  Neg (NEG)   


 


Urine Barbiturates  Neg (NEG)   


 


Urine Phencyclidine Screen  Neg (NEG)   


 


Urine


Amphetamine/Methamphetamine 


  Neg (NEG) 


  


  


 


 


Urine Benzodiazepines Screen  Pos (NEG)   


 


Urine Cocaine Screen  Neg (NEG)   


 


Urine Cannabinoids Screen  Pos (NEG)   


 


Urine Ethyl Alcohol  Neg (NEG)   


 


Glucose (Fingerstick)


  


  


  104mg/dL


(70-99) 


 














Test


  4/9/17


04:39 4/9/17


07:42 4/9/17


11:47 


 


 


White Blood Count


  6.2x10^3/uL


(4.0-11.0) 


  


  


 


 


Red Blood Count


  3.92x10^6/uL


(4.30-5.70) 


  


  


 


 


Hemoglobin


  12.6g/dL


(13.0-17.5) 


  


  


 


 


Hematocrit


  37.1%


(39.0-53.0) 


  


  


 


 


Mean Corpuscular Volume 95fL ()    


 


Mean Corpuscular Hemoglobin 32pg (25-35)    


 


Mean Corpuscular Hemoglobin


Concent 34g/dL (31-37) 


  


  


  


 


 


Red Cell Distribution Width


  13.7%


(11.5-14.5) 


  


  


 


 


Platelet Count


  207x10^3/uL


(140-400) 


  


  


 


 


Neutrophils (%) (Auto) 58% (31-73)    


 


Lymphocytes (%) (Auto) 28% (24-48)    


 


Monocytes (%) (Auto) 8% (0-9)    


 


Eosinophils (%) (Auto) 5% (0-3)    


 


Basophils (%) (Auto) 2% (0-3)    


 


Neutrophils # (Auto)


  3.6x10^3uL


(1.8-7.7) 


  


  


 


 


Lymphocytes # (Auto)


  1.8x10^3/uL


(1.0-4.8) 


  


  


 


 


Monocytes # (Auto)


  0.5x10^3/uL


(0.0-1.1) 


  


  


 


 


Eosinophils # (Auto)


  0.3x10^3/uL


(0.0-0.7) 


  


  


 


 


Basophils # (Auto)


  0.1x10^3/uL


(0.0-0.2) 


  


  


 


 


Glucose (Fingerstick)


  


  168mg/dL


(70-99) 187mg/dL


(70-99) 


 











Images


Images


MRI brain:


There are few small foci of FLAIR signal hyperintensity in the 


periventricular white matter but these are nonspecific and probably 


related to sequelae of mild chronic small vessel ischemic disease. No 


abnormal intracranial enhancement. No evidence of acute intracranial 


hemorrhage. No restricted diffusion to indicate acute infarct. No 


extra-axial fluid collections. No midline shift or mass effect. 


Ventricular size is appropriate. 


 


 


Midline structures have a normal anatomic configuration. Pituitary gland 


and infundibulum are unremarkable. Basal cisterns are patent. Arterial 


flow voids at the skull base and major dural venous sinuses are 


maintained. Globes and orbits are unremarkable. There is very mild mucosal


thickening of the ethmoid air cells. 


 


 


IMPRESSION: 


 


No acute intracranial abnormality. No abnormal enhancement or mass.





Assessment/Plan


Assessment/Plan


Impression:


Toxic encephalopathy related to Valium, buspirone, also contribution from 

marijuana, possible Wellbutrin withdrawal effects. No evidence of stroke.


Chronic dizziness, no bedside evidence of vestibular dysfunction either central 

or peripheral.





Recommendations:


Okay for discharge


Follow-up with primary physician for reevaluation of psychiatric medications


Follow-up with neurology as needed. 





Thank you for letting me help with the patient's care.








TANYA MINOR MD Apr 9, 2017 16:31 yes

## 2021-09-24 ENCOUNTER — HOSPITAL ENCOUNTER (OUTPATIENT)
Dept: HOSPITAL 61 - RAD | Age: 61
End: 2021-09-24
Payer: COMMERCIAL

## 2021-09-24 DIAGNOSIS — K44.9: Primary | ICD-10-CM

## 2021-09-24 DIAGNOSIS — R13.10: ICD-10-CM

## 2021-09-24 DIAGNOSIS — F03.90: ICD-10-CM

## 2021-09-24 PROCEDURE — 74220 X-RAY XM ESOPHAGUS 1CNTRST: CPT

## 2021-09-24 NOTE — RAD
ESOPHAGRAM 9/24/2021.



Reason for study: Dysphagia



Comparison studies: None.



Technique: Esophagram was performed utilizing double contrast upright examination, single contrast pr
one examination, and cine evaluation of cervical esophageal swallow function.



Findings: Barium swallow was performed without difficulty. There is deep penetration of thin contrast
 which remains in the laryngeal vestibule. Debris identified within the thoracic esophagus on initial
 swallow. This cleared with early swallows. Esophageal peristalsis and motility were normal. No mucos
al abnormalities. No gastroesophageal reflux small sliding-type hiatal hernia. No esophageal divertic
ulum. Fundus of the stomach was unremarkable.



Fluoroscopy time: 1.8 minutes

Number of images: 11



IMPRESSION:

1. Deep penetration of thin contrast which remains in the laryngeal vestibule.

2. Debris identified within the thoracic esophagus on initial swallow. This cleared with early swallo
ws.

3. Small sliding-type hiatal hernia.



Electronically signed by: Rosio Woodall MD (9/24/2021 5:15 PM) [id number]